# Patient Record
Sex: MALE | Race: WHITE | NOT HISPANIC OR LATINO | Employment: FULL TIME | URBAN - NONMETROPOLITAN AREA
[De-identification: names, ages, dates, MRNs, and addresses within clinical notes are randomized per-mention and may not be internally consistent; named-entity substitution may affect disease eponyms.]

---

## 2018-11-14 ENCOUNTER — OFFICE VISIT CONVERTED (OUTPATIENT)
Dept: OTOLARYNGOLOGY | Facility: CLINIC | Age: 63
End: 2018-11-14
Attending: OTOLARYNGOLOGY

## 2019-01-07 ENCOUNTER — HOSPITAL ENCOUNTER (OUTPATIENT)
Dept: LAB | Facility: HOSPITAL | Age: 64
Discharge: HOME | End: 2019-01-07
Attending: INTERNAL MEDICINE

## 2019-01-07 LAB
ALBUMIN SERPL-MCNC: 4.4 G/DL (ref 3.5–5)
ALBUMIN/GLOB SERPL: 1.9 {RATIO} (ref 1.4–2.6)
ALP SERPL-CCNC: 169 U/L (ref 56–155)
ALT SERPL-CCNC: 56 U/L (ref 10–40)
ANION GAP SERPL CALC-SCNC: 14 MMOL/L (ref 8–19)
AST SERPL-CCNC: 40 U/L (ref 15–50)
BASOPHILS # BLD AUTO: 0.02 10*3/UL (ref 0–0.2)
BASOPHILS NFR BLD AUTO: 0.38 % (ref 0–3)
BILIRUB SERPL-MCNC: 0.44 MG/DL (ref 0.2–1.3)
BUN SERPL-MCNC: 12 MG/DL (ref 5–25)
BUN/CREAT SERPL: 12 {RATIO} (ref 6–20)
CALCIUM SERPL-MCNC: 9.4 MG/DL (ref 8.7–10.4)
CHLORIDE SERPL-SCNC: 106 MMOL/L (ref 99–111)
CHOLEST SERPL-MCNC: 147 MG/DL (ref 107–200)
CHOLEST/HDLC SERPL: 2.7 {RATIO} (ref 3–6)
CK SERPL-CCNC: 148 U/L (ref 57–374)
CONV CO2: 26 MMOL/L (ref 22–32)
CONV TOTAL PROTEIN: 6.7 G/DL (ref 6.3–8.2)
CREAT UR-MCNC: 0.97 MG/DL (ref 0.7–1.2)
EOSINOPHIL # BLD AUTO: 0.01 10*3/UL (ref 0–0.7)
EOSINOPHIL # BLD AUTO: 0.13 % (ref 0–7)
ERYTHROCYTE [DISTWIDTH] IN BLOOD BY AUTOMATED COUNT: 11.3 % (ref 11.5–14.5)
GFR SERPLBLD BASED ON 1.73 SQ M-ARVRAT: >60 ML/MIN/{1.73_M2}
GLOBULIN UR ELPH-MCNC: 2.3 G/DL (ref 2–3.5)
GLUCOSE SERPL-MCNC: 93 MG/DL (ref 70–99)
HBA1C MFR BLD: 13.4 G/DL (ref 14–18)
HCT VFR BLD AUTO: 39.9 % (ref 42–52)
HDLC SERPL-MCNC: 54 MG/DL (ref 40–60)
LDLC SERPL CALC-MCNC: 81 MG/DL (ref 70–100)
LYMPHOCYTES # BLD AUTO: 1.49 10*3/UL (ref 1–5)
MCH RBC QN AUTO: 32 PG (ref 27–31)
MCHC RBC AUTO-ENTMCNC: 33.7 G/DL (ref 33–37)
MCV RBC AUTO: 95.1 FL (ref 80–96)
MONOCYTES # BLD AUTO: 0.47 10*3/UL (ref 0.2–1.2)
MONOCYTES NFR BLD AUTO: 12 % (ref 3–10)
NEUTROPHILS # BLD AUTO: 1.96 10*3/UL (ref 2–8)
NEUTROPHILS NFR BLD AUTO: 49.8 % (ref 30–85)
NRBC BLD AUTO-RTO: 0 % (ref 0–0.01)
OSMOLALITY SERPL CALC.SUM OF ELEC: 293 MOSM/KG (ref 273–304)
PLATELET # BLD AUTO: 270 10*3/UL (ref 130–400)
PMV BLD AUTO: 7.3 FL (ref 7.4–10.4)
POTASSIUM SERPL-SCNC: 4.1 MMOL/L (ref 3.5–5.3)
RBC # BLD AUTO: 4.2 10*6/UL (ref 4.7–6.1)
SODIUM SERPL-SCNC: 142 MMOL/L (ref 135–147)
TRIGL SERPL-MCNC: 61 MG/DL (ref 40–150)
TSH SERPL-ACNC: 0.31 M[IU]/L (ref 0.27–4.2)
VARIANT LYMPHS NFR BLD MANUAL: 37.7 % (ref 20–45)
VLDLC SERPL-MCNC: 12 MG/DL (ref 5–37)
WBC # BLD AUTO: 3.94 10*3/UL (ref 4.8–10.8)

## 2019-07-03 ENCOUNTER — HOSPITAL ENCOUNTER (OUTPATIENT)
Dept: LAB | Facility: HOSPITAL | Age: 64
Discharge: HOME | End: 2019-07-03
Attending: INTERNAL MEDICINE

## 2019-07-03 LAB
ALBUMIN SERPL-MCNC: 4.5 G/DL (ref 3.5–5)
ALBUMIN/GLOB SERPL: 1.8 {RATIO} (ref 1.4–2.6)
ALP SERPL-CCNC: 120 U/L (ref 56–155)
ALT SERPL-CCNC: 27 U/L (ref 10–40)
AMYLASE SERPL-CCNC: 41 U/L (ref 30–110)
ANION GAP SERPL CALC-SCNC: 17 MMOL/L (ref 8–19)
AST SERPL-CCNC: 34 U/L (ref 15–50)
BASOPHILS # BLD AUTO: 0.04 10*3/UL (ref 0–0.2)
BASOPHILS NFR BLD AUTO: 1.1 % (ref 0–3)
BILIRUB SERPL-MCNC: 0.66 MG/DL (ref 0.2–1.3)
BUN SERPL-MCNC: 14 MG/DL (ref 5–25)
BUN/CREAT SERPL: 15 {RATIO} (ref 6–20)
CALCIUM SERPL-MCNC: 9.4 MG/DL (ref 8.7–10.4)
CHLORIDE SERPL-SCNC: 101 MMOL/L (ref 99–111)
CHOLEST SERPL-MCNC: 133 MG/DL (ref 107–200)
CHOLEST/HDLC SERPL: 2.4 {RATIO} (ref 3–6)
CONV ABS IMM GRAN: 0 10*3/UL (ref 0–0.2)
CONV CO2: 27 MMOL/L (ref 22–32)
CONV IMMATURE GRAN: 0 % (ref 0–1.8)
CONV TOTAL PROTEIN: 7 G/DL (ref 6.3–8.2)
CREAT UR-MCNC: 0.96 MG/DL (ref 0.7–1.2)
DEPRECATED RDW RBC AUTO: 43.2 FL (ref 35.1–43.9)
EOSINOPHIL # BLD AUTO: 0 % (ref 0–7)
EOSINOPHIL # BLD AUTO: 0 10*3/UL (ref 0–0.7)
ERYTHROCYTE [DISTWIDTH] IN BLOOD BY AUTOMATED COUNT: 12.2 % (ref 11.6–14.4)
GFR SERPLBLD BASED ON 1.73 SQ M-ARVRAT: >60 ML/MIN/{1.73_M2}
GLOBULIN UR ELPH-MCNC: 2.5 G/DL (ref 2–3.5)
GLUCOSE SERPL-MCNC: 92 MG/DL (ref 70–99)
HBA1C MFR BLD: 13.9 G/DL (ref 14–18)
HCT VFR BLD AUTO: 42.7 % (ref 42–52)
HDLC SERPL-MCNC: 56 MG/DL (ref 40–60)
LDLC SERPL CALC-MCNC: 67 MG/DL (ref 70–100)
LIPASE SERPL-CCNC: 30 U/L (ref 5–51)
LYMPHOCYTES # BLD AUTO: 1.19 10*3/UL (ref 1–5)
MCH RBC QN AUTO: 31.4 PG (ref 27–31)
MCHC RBC AUTO-ENTMCNC: 32.6 G/DL (ref 33–37)
MCV RBC AUTO: 96.4 FL (ref 80–96)
MONOCYTES # BLD AUTO: 0.43 10*3/UL (ref 0.2–1.2)
MONOCYTES NFR BLD AUTO: 11.6 % (ref 3–10)
NEUTROPHILS # BLD AUTO: 2.06 10*3/UL (ref 2–8)
NEUTROPHILS NFR BLD AUTO: 55.3 % (ref 30–85)
NRBC CBCN: 0 % (ref 0–0.7)
OSMOLALITY SERPL CALC.SUM OF ELEC: 292 MOSM/KG (ref 273–304)
PLATELET # BLD AUTO: 216 10*3/UL (ref 130–400)
PMV BLD AUTO: 10.3 FL (ref 9.4–12.4)
POTASSIUM SERPL-SCNC: 3.9 MMOL/L (ref 3.5–5.3)
RBC # BLD AUTO: 4.43 10*6/UL (ref 4.7–6.1)
SODIUM SERPL-SCNC: 141 MMOL/L (ref 135–147)
TRIGL SERPL-MCNC: 51 MG/DL (ref 40–150)
TSH SERPL-ACNC: 0.17 M[IU]/L (ref 0.27–4.2)
VARIANT LYMPHS NFR BLD MANUAL: 32 % (ref 20–45)
VLDLC SERPL-MCNC: 10 MG/DL (ref 5–37)
WBC # BLD AUTO: 3.72 10*3/UL (ref 4.8–10.8)

## 2019-08-06 ENCOUNTER — CONVERSION ENCOUNTER (OUTPATIENT)
Dept: GASTROENTEROLOGY | Facility: CLINIC | Age: 64
End: 2019-08-06
Attending: INTERNAL MEDICINE

## 2019-09-16 ENCOUNTER — HOSPITAL ENCOUNTER (OUTPATIENT)
Dept: GASTROENTEROLOGY | Facility: HOSPITAL | Age: 64
Setting detail: HOSPITAL OUTPATIENT SURGERY
Discharge: HOME | End: 2019-09-16
Attending: INTERNAL MEDICINE

## 2019-09-16 LAB
ALBUMIN SERPL-MCNC: 4.8 G/DL (ref 3.5–5)
ALBUMIN/GLOB SERPL: 1.8 {RATIO} (ref 1.4–2.6)
ALP SERPL-CCNC: 165 U/L (ref 56–155)
ALT SERPL-CCNC: 38 U/L (ref 10–40)
ANION GAP SERPL CALC-SCNC: 18 MMOL/L (ref 8–19)
AST SERPL-CCNC: 34 U/L (ref 15–50)
BASOPHILS # BLD AUTO: 0.02 10*3/UL (ref 0–0.2)
BASOPHILS NFR BLD AUTO: 0.6 % (ref 0–3)
BILIRUB SERPL-MCNC: 0.57 MG/DL (ref 0.2–1.3)
BUN SERPL-MCNC: 12 MG/DL (ref 5–25)
BUN/CREAT SERPL: 13 {RATIO} (ref 6–20)
CALCIUM SERPL-MCNC: 9.5 MG/DL (ref 8.7–10.4)
CANCER AG19-9 SERPL-ACNC: 16.7 U/ML (ref 0–35)
CHLORIDE SERPL-SCNC: 105 MMOL/L (ref 99–111)
CONV ABS IMM GRAN: 0.01 10*3/UL (ref 0–0.2)
CONV CO2: 23 MMOL/L (ref 22–32)
CONV IMMATURE GRAN: 0.3 % (ref 0–1.8)
CONV TOTAL PROTEIN: 7.4 G/DL (ref 6.3–8.2)
CREAT UR-MCNC: 0.94 MG/DL (ref 0.7–1.2)
DEPRECATED RDW RBC AUTO: 42.2 FL (ref 35.1–43.9)
EOSINOPHIL # BLD AUTO: 0.01 10*3/UL (ref 0–0.7)
EOSINOPHIL # BLD AUTO: 0.3 % (ref 0–7)
ERYTHROCYTE [DISTWIDTH] IN BLOOD BY AUTOMATED COUNT: 12 % (ref 11.6–14.4)
GFR SERPLBLD BASED ON 1.73 SQ M-ARVRAT: >60 ML/MIN/{1.73_M2}
GLOBULIN UR ELPH-MCNC: 2.6 G/DL (ref 2–3.5)
GLUCOSE SERPL-MCNC: 88 MG/DL (ref 70–99)
HCT VFR BLD AUTO: 44.5 % (ref 42–52)
HGB BLD-MCNC: 14.7 G/DL (ref 14–18)
LYMPHOCYTES # BLD AUTO: 0.73 10*3/UL (ref 1–5)
LYMPHOCYTES NFR BLD AUTO: 20.2 % (ref 20–45)
MCH RBC QN AUTO: 31.6 PG (ref 27–31)
MCHC RBC AUTO-ENTMCNC: 33 G/DL (ref 33–37)
MCV RBC AUTO: 95.7 FL (ref 80–96)
MONOCYTES # BLD AUTO: 0.3 10*3/UL (ref 0.2–1.2)
MONOCYTES NFR BLD AUTO: 8.3 % (ref 3–10)
NEUTROPHILS # BLD AUTO: 2.55 10*3/UL (ref 2–8)
NEUTROPHILS NFR BLD AUTO: 70.3 % (ref 30–85)
NRBC CBCN: 0 % (ref 0–0.7)
OSMOLALITY SERPL CALC.SUM OF ELEC: 293 MOSM/KG (ref 273–304)
PLATELET # BLD AUTO: 228 10*3/UL (ref 130–400)
PMV BLD AUTO: 9.8 FL (ref 9.4–12.4)
POTASSIUM SERPL-SCNC: 4.3 MMOL/L (ref 3.5–5.3)
RBC # BLD AUTO: 4.65 10*6/UL (ref 4.7–6.1)
SODIUM SERPL-SCNC: 142 MMOL/L (ref 135–147)
WBC # BLD AUTO: 3.62 10*3/UL (ref 4.8–10.8)

## 2019-11-05 ENCOUNTER — HOSPITAL ENCOUNTER (OUTPATIENT)
Dept: LAB | Facility: HOSPITAL | Age: 64
Discharge: HOME | End: 2019-11-05
Attending: INTERNAL MEDICINE

## 2019-11-05 LAB — TSH SERPL-ACNC: 0.86 M[IU]/L (ref 0.27–4.2)

## 2019-12-05 ENCOUNTER — HOSPITAL ENCOUNTER (OUTPATIENT)
Dept: OTHER | Facility: HOSPITAL | Age: 64
Discharge: HOME | End: 2019-12-05
Attending: PHYSICIAN ASSISTANT

## 2019-12-05 LAB
ALBUMIN SERPL-MCNC: 4.5 G/DL (ref 3.5–5)
ALBUMIN/GLOB SERPL: 1.5 {RATIO} (ref 1.4–2.6)
ALP SERPL-CCNC: 627 U/L (ref 56–155)
ALT SERPL-CCNC: 413 U/L (ref 10–40)
AMYLASE SERPL-CCNC: 41 U/L (ref 30–110)
ANION GAP SERPL CALC-SCNC: 17 MMOL/L (ref 8–19)
APPEARANCE UR: CLEAR
AST SERPL-CCNC: 406 U/L (ref 15–50)
BASOPHILS # BLD AUTO: 0.04 10*3/UL (ref 0–0.2)
BASOPHILS NFR BLD AUTO: 0.9 % (ref 0–3)
BILIRUB SERPL-MCNC: 6.34 MG/DL (ref 0.2–1.3)
BILIRUB UR QL: ABNORMAL
BUN SERPL-MCNC: 10 MG/DL (ref 5–25)
BUN/CREAT SERPL: 12 {RATIO} (ref 6–20)
CALCIUM SERPL-MCNC: 9.7 MG/DL (ref 8.7–10.4)
CHLORIDE SERPL-SCNC: 99 MMOL/L (ref 99–111)
COLOR UR: ABNORMAL
CONV ABS IMM GRAN: 0.01 10*3/UL (ref 0–0.2)
CONV BACTERIA: NEGATIVE
CONV CO2: 26 MMOL/L (ref 22–32)
CONV COLLECTION SOURCE (UA): ABNORMAL
CONV IMMATURE GRAN: 0.2 % (ref 0–1.8)
CONV TOTAL PROTEIN: 7.6 G/DL (ref 6.3–8.2)
CONV UROBILINOGEN IN URINE BY AUTOMATED TEST STRIP: 0.2 {EHRLICHU}/DL (ref 0.1–1)
CREAT UR-MCNC: 0.81 MG/DL (ref 0.7–1.2)
DEPRECATED RDW RBC AUTO: 46.7 FL (ref 35.1–43.9)
EOSINOPHIL # BLD AUTO: 0 % (ref 0–7)
EOSINOPHIL # BLD AUTO: 0 10*3/UL (ref 0–0.7)
ERYTHROCYTE [DISTWIDTH] IN BLOOD BY AUTOMATED COUNT: 13.2 % (ref 11.6–14.4)
GFR SERPLBLD BASED ON 1.73 SQ M-ARVRAT: >60 ML/MIN/{1.73_M2}
GLOBULIN UR ELPH-MCNC: 3.1 G/DL (ref 2–3.5)
GLUCOSE SERPL-MCNC: 102 MG/DL (ref 70–99)
GLUCOSE UR QL: NEGATIVE MG/DL
HCT VFR BLD AUTO: 43 % (ref 42–52)
HGB BLD-MCNC: 14 G/DL (ref 14–18)
HGB UR QL STRIP: NEGATIVE
KETONES UR QL STRIP: NEGATIVE MG/DL
LEUKOCYTE ESTERASE UR QL STRIP: ABNORMAL
LIPASE SERPL-CCNC: 55 U/L (ref 5–51)
LYMPHOCYTES # BLD AUTO: 1.11 10*3/UL (ref 1–5)
LYMPHOCYTES NFR BLD AUTO: 26.1 % (ref 20–45)
MCH RBC QN AUTO: 31 PG (ref 27–31)
MCHC RBC AUTO-ENTMCNC: 32.6 G/DL (ref 33–37)
MCV RBC AUTO: 95.3 FL (ref 80–96)
MONOCYTES # BLD AUTO: 0.6 10*3/UL (ref 0.2–1.2)
MONOCYTES NFR BLD AUTO: 14.1 % (ref 3–10)
NEUTROPHILS # BLD AUTO: 2.49 10*3/UL (ref 2–8)
NEUTROPHILS NFR BLD AUTO: 58.7 % (ref 30–85)
NITRITE UR QL STRIP: NEGATIVE
NRBC CBCN: 0 % (ref 0–0.7)
OSMOLALITY SERPL CALC.SUM OF ELEC: 287 MOSM/KG (ref 273–304)
PH UR STRIP.AUTO: 7 [PH] (ref 5–8)
PLATELET # BLD AUTO: 258 10*3/UL (ref 130–400)
PMV BLD AUTO: 10.4 FL (ref 9.4–12.4)
POTASSIUM SERPL-SCNC: 3.4 MMOL/L (ref 3.5–5.3)
PROT UR QL: ABNORMAL MG/DL
RBC # BLD AUTO: 4.51 10*6/UL (ref 4.7–6.1)
RBC #/AREA URNS HPF: ABNORMAL /[HPF]
SODIUM SERPL-SCNC: 139 MMOL/L (ref 135–147)
SP GR UR: 1.02 (ref 1–1.03)
WBC # BLD AUTO: 4.25 10*3/UL (ref 4.8–10.8)
WBC #/AREA URNS HPF: ABNORMAL /[HPF]

## 2020-01-10 ENCOUNTER — HOSPITAL ENCOUNTER (OUTPATIENT)
Dept: GENERAL RADIOLOGY | Facility: HOSPITAL | Age: 65
Discharge: HOME | End: 2020-01-10
Attending: INTERNAL MEDICINE

## 2020-01-10 LAB
ALBUMIN SERPL-MCNC: 4.2 G/DL (ref 3.5–5)
ALBUMIN/GLOB SERPL: 1.1 {RATIO} (ref 1.4–2.6)
ALP SERPL-CCNC: 290 U/L (ref 56–155)
ALT SERPL-CCNC: 43 U/L (ref 10–40)
ANION GAP SERPL CALC-SCNC: 21 MMOL/L (ref 8–19)
AST SERPL-CCNC: 49 U/L (ref 15–50)
BASOPHILS # BLD AUTO: 0.07 10*3/UL (ref 0–0.2)
BASOPHILS NFR BLD AUTO: 1.2 % (ref 0–3)
BILIRUB SERPL-MCNC: 1 MG/DL (ref 0.2–1.3)
BUN SERPL-MCNC: 12 MG/DL (ref 5–25)
BUN/CREAT SERPL: 12 {RATIO} (ref 6–20)
CALCIUM SERPL-MCNC: 9.8 MG/DL (ref 8.7–10.4)
CHLORIDE SERPL-SCNC: 101 MMOL/L (ref 99–111)
CHOLEST SERPL-MCNC: 176 MG/DL (ref 107–200)
CHOLEST/HDLC SERPL: 4.3 {RATIO} (ref 3–6)
CONV ABS IMM GRAN: 0.06 10*3/UL (ref 0–0.2)
CONV CO2: 23 MMOL/L (ref 22–32)
CONV IMMATURE GRAN: 1 % (ref 0–1.8)
CONV TOTAL PROTEIN: 8 G/DL (ref 6.3–8.2)
CREAT UR-MCNC: 0.99 MG/DL (ref 0.7–1.2)
DEPRECATED RDW RBC AUTO: 45.6 FL (ref 35.1–43.9)
EOSINOPHIL # BLD AUTO: 0.02 10*3/UL (ref 0–0.7)
EOSINOPHIL # BLD AUTO: 0.3 % (ref 0–7)
ERYTHROCYTE [DISTWIDTH] IN BLOOD BY AUTOMATED COUNT: 13 % (ref 11.6–14.4)
GFR SERPLBLD BASED ON 1.73 SQ M-ARVRAT: >60 ML/MIN/{1.73_M2}
GLOBULIN UR ELPH-MCNC: 3.8 G/DL (ref 2–3.5)
GLUCOSE SERPL-MCNC: 100 MG/DL (ref 70–99)
HCT VFR BLD AUTO: 40.7 % (ref 42–52)
HDLC SERPL-MCNC: 41 MG/DL (ref 40–60)
HGB BLD-MCNC: 12.8 G/DL (ref 14–18)
LDLC SERPL CALC-MCNC: 118 MG/DL (ref 70–100)
LYMPHOCYTES # BLD AUTO: 1.09 10*3/UL (ref 1–5)
LYMPHOCYTES NFR BLD AUTO: 18.9 % (ref 20–45)
MCH RBC QN AUTO: 30.5 PG (ref 27–31)
MCHC RBC AUTO-ENTMCNC: 31.4 G/DL (ref 33–37)
MCV RBC AUTO: 96.9 FL (ref 80–96)
MONOCYTES # BLD AUTO: 0.56 10*3/UL (ref 0.2–1.2)
MONOCYTES NFR BLD AUTO: 9.7 % (ref 3–10)
NEUTROPHILS # BLD AUTO: 3.98 10*3/UL (ref 2–8)
NEUTROPHILS NFR BLD AUTO: 68.9 % (ref 30–85)
NRBC CBCN: 0 % (ref 0–0.7)
OSMOLALITY SERPL CALC.SUM OF ELEC: 292 MOSM/KG (ref 273–304)
PLATELET # BLD AUTO: 567 10*3/UL (ref 130–400)
PMV BLD AUTO: 9.1 FL (ref 9.4–12.4)
POTASSIUM SERPL-SCNC: 4.2 MMOL/L (ref 3.5–5.3)
PSA SERPL-MCNC: 5.26 NG/ML (ref 0–4)
RBC # BLD AUTO: 4.2 10*6/UL (ref 4.7–6.1)
SODIUM SERPL-SCNC: 141 MMOL/L (ref 135–147)
TRIGL SERPL-MCNC: 85 MG/DL (ref 40–150)
TSH SERPL-ACNC: 23.5 M[IU]/L (ref 0.27–4.2)
VLDLC SERPL-MCNC: 17 MG/DL (ref 5–37)
WBC # BLD AUTO: 5.78 10*3/UL (ref 4.8–10.8)

## 2020-01-16 ENCOUNTER — OFFICE VISIT CONVERTED (OUTPATIENT)
Dept: GASTROENTEROLOGY | Facility: CLINIC | Age: 65
End: 2020-01-16
Attending: INTERNAL MEDICINE

## 2020-01-16 ENCOUNTER — CONVERSION ENCOUNTER (OUTPATIENT)
Dept: GASTROENTEROLOGY | Facility: CLINIC | Age: 65
End: 2020-01-16

## 2020-02-21 ENCOUNTER — HOSPITAL ENCOUNTER (OUTPATIENT)
Dept: GENERAL RADIOLOGY | Facility: HOSPITAL | Age: 65
Discharge: HOME | End: 2020-02-21
Attending: INTERNAL MEDICINE

## 2020-02-21 LAB — TSH SERPL-ACNC: 0.43 M[IU]/L (ref 0.27–4.2)

## 2020-04-13 ENCOUNTER — HOSPITAL ENCOUNTER (OUTPATIENT)
Dept: GENERAL RADIOLOGY | Facility: HOSPITAL | Age: 65
Discharge: HOME | End: 2020-04-13
Attending: INTERNAL MEDICINE

## 2020-04-13 LAB
ALBUMIN SERPL-MCNC: 4.5 G/DL (ref 3.5–5)
ALBUMIN/GLOB SERPL: 1.6 {RATIO} (ref 1.4–2.6)
ALP SERPL-CCNC: 184 U/L (ref 56–155)
ALT SERPL-CCNC: 31 U/L (ref 10–40)
ANION GAP SERPL CALC-SCNC: 14 MMOL/L (ref 8–19)
AST SERPL-CCNC: 39 U/L (ref 15–50)
BASOPHILS # BLD AUTO: 0.04 10*3/UL (ref 0–0.2)
BASOPHILS NFR BLD AUTO: 1 % (ref 0–3)
BILIRUB SERPL-MCNC: 0.51 MG/DL (ref 0.2–1.3)
BUN SERPL-MCNC: 13 MG/DL (ref 5–25)
BUN/CREAT SERPL: 13 {RATIO} (ref 6–20)
CALCIUM SERPL-MCNC: 9.5 MG/DL (ref 8.7–10.4)
CHLORIDE SERPL-SCNC: 99 MMOL/L (ref 99–111)
CHOLEST SERPL-MCNC: 149 MG/DL (ref 107–200)
CHOLEST/HDLC SERPL: 2.7 {RATIO} (ref 3–6)
CONV ABS IMM GRAN: 0.01 10*3/UL (ref 0–0.2)
CONV CO2: 28 MMOL/L (ref 22–32)
CONV IMMATURE GRAN: 0.2 % (ref 0–1.8)
CONV TOTAL PROTEIN: 7.4 G/DL (ref 6.3–8.2)
CREAT UR-MCNC: 0.97 MG/DL (ref 0.7–1.2)
DEPRECATED RDW RBC AUTO: 44.9 FL (ref 35.1–43.9)
EOSINOPHIL # BLD AUTO: 0.01 10*3/UL (ref 0–0.7)
EOSINOPHIL # BLD AUTO: 0.2 % (ref 0–7)
ERYTHROCYTE [DISTWIDTH] IN BLOOD BY AUTOMATED COUNT: 13.2 % (ref 11.6–14.4)
GFR SERPLBLD BASED ON 1.73 SQ M-ARVRAT: >60 ML/MIN/{1.73_M2}
GLOBULIN UR ELPH-MCNC: 2.9 G/DL (ref 2–3.5)
GLUCOSE SERPL-MCNC: 100 MG/DL (ref 70–99)
HCT VFR BLD AUTO: 46.7 % (ref 42–52)
HDLC SERPL-MCNC: 56 MG/DL (ref 40–60)
HGB BLD-MCNC: 14.8 G/DL (ref 14–18)
LDLC SERPL CALC-MCNC: 74 MG/DL (ref 70–100)
LYMPHOCYTES # BLD AUTO: 1.2 10*3/UL (ref 1–5)
LYMPHOCYTES NFR BLD AUTO: 29.5 % (ref 20–45)
MCH RBC QN AUTO: 29.6 PG (ref 27–31)
MCHC RBC AUTO-ENTMCNC: 31.7 G/DL (ref 33–37)
MCV RBC AUTO: 93.4 FL (ref 80–96)
MONOCYTES # BLD AUTO: 0.45 10*3/UL (ref 0.2–1.2)
MONOCYTES NFR BLD AUTO: 11.1 % (ref 3–10)
NEUTROPHILS # BLD AUTO: 2.36 10*3/UL (ref 2–8)
NEUTROPHILS NFR BLD AUTO: 58 % (ref 30–85)
NRBC CBCN: 0 % (ref 0–0.7)
OSMOLALITY SERPL CALC.SUM OF ELEC: 284 MOSM/KG (ref 273–304)
PLATELET # BLD AUTO: 277 10*3/UL (ref 130–400)
PMV BLD AUTO: 10.2 FL (ref 9.4–12.4)
POTASSIUM SERPL-SCNC: 4.1 MMOL/L (ref 3.5–5.3)
RBC # BLD AUTO: 5 10*6/UL (ref 4.7–6.1)
SODIUM SERPL-SCNC: 137 MMOL/L (ref 135–147)
TRIGL SERPL-MCNC: 95 MG/DL (ref 40–150)
TSH SERPL-ACNC: 0.61 M[IU]/L (ref 0.27–4.2)
VLDLC SERPL-MCNC: 19 MG/DL (ref 5–37)
WBC # BLD AUTO: 4.07 10*3/UL (ref 4.8–10.8)

## 2020-04-17 ENCOUNTER — HOSPITAL ENCOUNTER (OUTPATIENT)
Dept: LAB | Facility: HOSPITAL | Age: 65
Discharge: HOME | End: 2020-04-17
Attending: INTERNAL MEDICINE

## 2020-07-13 ENCOUNTER — HOSPITAL ENCOUNTER (OUTPATIENT)
Dept: LAB | Facility: HOSPITAL | Age: 65
Discharge: HOME | End: 2020-07-13
Attending: INTERNAL MEDICINE

## 2020-07-13 LAB
ALBUMIN SERPL-MCNC: 4.4 G/DL (ref 3.5–5)
ALBUMIN/GLOB SERPL: 1.8 {RATIO} (ref 1.4–2.6)
ALP SERPL-CCNC: 140 U/L (ref 56–155)
ALT SERPL-CCNC: 31 U/L (ref 10–40)
ANION GAP SERPL CALC-SCNC: 17 MMOL/L (ref 8–19)
AST SERPL-CCNC: 36 U/L (ref 15–50)
BILIRUB SERPL-MCNC: 0.47 MG/DL (ref 0.2–1.3)
BUN SERPL-MCNC: 12 MG/DL (ref 5–25)
BUN/CREAT SERPL: 12 {RATIO} (ref 6–20)
CALCIUM SERPL-MCNC: 9.1 MG/DL (ref 8.7–10.4)
CHLORIDE SERPL-SCNC: 107 MMOL/L (ref 99–111)
CHOLEST SERPL-MCNC: 127 MG/DL (ref 107–200)
CHOLEST/HDLC SERPL: 2.3 {RATIO} (ref 3–6)
CONV CO2: 23 MMOL/L (ref 22–32)
CONV TOTAL PROTEIN: 6.8 G/DL (ref 6.3–8.2)
CREAT UR-MCNC: 1 MG/DL (ref 0.7–1.2)
GFR SERPLBLD BASED ON 1.73 SQ M-ARVRAT: >60 ML/MIN/{1.73_M2}
GLOBULIN UR ELPH-MCNC: 2.4 G/DL (ref 2–3.5)
GLUCOSE SERPL-MCNC: 94 MG/DL (ref 70–99)
HDLC SERPL-MCNC: 55 MG/DL (ref 40–60)
LDLC SERPL CALC-MCNC: 59 MG/DL (ref 70–100)
OSMOLALITY SERPL CALC.SUM OF ELEC: 296 MOSM/KG (ref 273–304)
POTASSIUM SERPL-SCNC: 3.9 MMOL/L (ref 3.5–5.3)
PSA SERPL-MCNC: 3.95 NG/ML (ref 0–4)
SODIUM SERPL-SCNC: 143 MMOL/L (ref 135–147)
TRIGL SERPL-MCNC: 63 MG/DL (ref 40–150)
TSH SERPL-ACNC: 0.84 M[IU]/L (ref 0.27–4.2)
VLDLC SERPL-MCNC: 13 MG/DL (ref 5–37)

## 2020-10-12 ENCOUNTER — HOSPITAL ENCOUNTER (OUTPATIENT)
Dept: LAB | Facility: HOSPITAL | Age: 65
Discharge: HOME | End: 2020-10-12
Attending: INTERNAL MEDICINE

## 2020-10-12 LAB
ALBUMIN SERPL-MCNC: 4.3 G/DL (ref 3.5–5)
ALBUMIN/GLOB SERPL: 1.8 {RATIO} (ref 1.4–2.6)
ALP SERPL-CCNC: 121 U/L (ref 56–155)
ALT SERPL-CCNC: 26 U/L (ref 10–40)
AMYLASE SERPL-CCNC: 44 U/L (ref 30–110)
ANION GAP SERPL CALC-SCNC: 15 MMOL/L (ref 8–19)
AST SERPL-CCNC: 37 U/L (ref 15–50)
BASOPHILS # BLD AUTO: 0.03 10*3/UL (ref 0–0.2)
BASOPHILS NFR BLD AUTO: 1 % (ref 0–3)
BILIRUB SERPL-MCNC: 0.46 MG/DL (ref 0.2–1.3)
BUN SERPL-MCNC: 10 MG/DL (ref 5–25)
BUN/CREAT SERPL: 9 {RATIO} (ref 6–20)
CALCIUM SERPL-MCNC: 9.8 MG/DL (ref 8.7–10.4)
CHLORIDE SERPL-SCNC: 103 MMOL/L (ref 99–111)
CONV ABS IMM GRAN: 0.01 10*3/UL (ref 0–0.2)
CONV CO2: 26 MMOL/L (ref 22–32)
CONV IMMATURE GRAN: 0.3 % (ref 0–1.8)
CONV TOTAL PROTEIN: 6.7 G/DL (ref 6.3–8.2)
CREAT UR-MCNC: 1.07 MG/DL (ref 0.7–1.2)
DEPRECATED RDW RBC AUTO: 44.7 FL (ref 35.1–43.9)
EOSINOPHIL # BLD AUTO: 0 % (ref 0–7)
EOSINOPHIL # BLD AUTO: 0 10*3/UL (ref 0–0.7)
ERYTHROCYTE [DISTWIDTH] IN BLOOD BY AUTOMATED COUNT: 12.5 % (ref 11.6–14.4)
FOLATE SERPL-MCNC: >20 NG/ML (ref 4.8–20)
GFR SERPLBLD BASED ON 1.73 SQ M-ARVRAT: >60 ML/MIN/{1.73_M2}
GLOBULIN UR ELPH-MCNC: 2.4 G/DL (ref 2–3.5)
GLUCOSE SERPL-MCNC: 93 MG/DL (ref 70–99)
HCT VFR BLD AUTO: 42.9 % (ref 42–52)
HGB BLD-MCNC: 13.9 G/DL (ref 14–18)
LIPASE SERPL-CCNC: 23 U/L (ref 5–51)
LYMPHOCYTES # BLD AUTO: 0.98 10*3/UL (ref 1–5)
LYMPHOCYTES NFR BLD AUTO: 31.5 % (ref 20–45)
MCH RBC QN AUTO: 31.4 PG (ref 27–31)
MCHC RBC AUTO-ENTMCNC: 32.4 G/DL (ref 33–37)
MCV RBC AUTO: 97.1 FL (ref 80–96)
MONOCYTES # BLD AUTO: 0.36 10*3/UL (ref 0.2–1.2)
MONOCYTES NFR BLD AUTO: 11.6 % (ref 3–10)
NEUTROPHILS # BLD AUTO: 1.73 10*3/UL (ref 2–8)
NEUTROPHILS NFR BLD AUTO: 55.6 % (ref 30–85)
NRBC CBCN: 0 % (ref 0–0.7)
OSMOLALITY SERPL CALC.SUM OF ELEC: 289 MOSM/KG (ref 273–304)
PLATELET # BLD AUTO: 237 10*3/UL (ref 130–400)
PMV BLD AUTO: 10.1 FL (ref 9.4–12.4)
POTASSIUM SERPL-SCNC: 4.1 MMOL/L (ref 3.5–5.3)
RBC # BLD AUTO: 4.42 10*6/UL (ref 4.7–6.1)
SODIUM SERPL-SCNC: 140 MMOL/L (ref 135–147)
VIT B12 SERPL-MCNC: 421 PG/ML (ref 211–911)
WBC # BLD AUTO: 3.11 10*3/UL (ref 4.8–10.8)

## 2021-04-08 ENCOUNTER — HOSPITAL ENCOUNTER (OUTPATIENT)
Dept: LAB | Facility: HOSPITAL | Age: 66
Discharge: HOME | End: 2021-04-08
Attending: INTERNAL MEDICINE

## 2021-04-08 LAB
ALBUMIN SERPL-MCNC: 4.4 G/DL (ref 3.5–5)
ALBUMIN/GLOB SERPL: 1.8 {RATIO} (ref 1.4–2.6)
ALP SERPL-CCNC: 123 U/L (ref 56–155)
ALT SERPL-CCNC: 27 U/L (ref 10–40)
AMYLASE SERPL-CCNC: 48 U/L (ref 30–110)
ANION GAP SERPL CALC-SCNC: 13 MMOL/L (ref 8–19)
AST SERPL-CCNC: 36 U/L (ref 15–50)
BASOPHILS # BLD AUTO: 0.03 10*3/UL (ref 0–0.2)
BASOPHILS NFR BLD AUTO: 0.9 % (ref 0–3)
BILIRUB SERPL-MCNC: 0.55 MG/DL (ref 0.2–1.3)
BUN SERPL-MCNC: 15 MG/DL (ref 5–25)
BUN/CREAT SERPL: 15 {RATIO} (ref 6–20)
CALCIUM SERPL-MCNC: 9.2 MG/DL (ref 8.7–10.4)
CHLORIDE SERPL-SCNC: 105 MMOL/L (ref 99–111)
CHOLEST SERPL-MCNC: 155 MG/DL (ref 107–200)
CHOLEST/HDLC SERPL: 2.5 {RATIO} (ref 3–6)
CONV ABS IMM GRAN: 0.01 10*3/UL (ref 0–0.2)
CONV AFP: 7.4 NG/ML (ref 0–8.7)
CONV CO2: 27 MMOL/L (ref 22–32)
CONV IMMATURE GRAN: 0.3 % (ref 0–1.8)
CONV TOTAL PROTEIN: 6.9 G/DL (ref 6.3–8.2)
CREAT UR-MCNC: 1 MG/DL (ref 0.7–1.2)
DEPRECATED RDW RBC AUTO: 43.2 FL (ref 35.1–43.9)
EOSINOPHIL # BLD AUTO: 0 % (ref 0–7)
EOSINOPHIL # BLD AUTO: 0 10*3/UL (ref 0–0.7)
ERYTHROCYTE [DISTWIDTH] IN BLOOD BY AUTOMATED COUNT: 12.2 % (ref 11.6–14.4)
GFR SERPLBLD BASED ON 1.73 SQ M-ARVRAT: >60 ML/MIN/{1.73_M2}
GLOBULIN UR ELPH-MCNC: 2.5 G/DL (ref 2–3.5)
GLUCOSE SERPL-MCNC: 95 MG/DL (ref 70–99)
HCT VFR BLD AUTO: 42.1 % (ref 42–52)
HDLC SERPL-MCNC: 62 MG/DL (ref 40–60)
HGB BLD-MCNC: 13.9 G/DL (ref 14–18)
LDLC SERPL CALC-MCNC: 80 MG/DL (ref 70–100)
LIPASE SERPL-CCNC: 37 U/L (ref 5–51)
LYMPHOCYTES # BLD AUTO: 1.2 10*3/UL (ref 1–5)
LYMPHOCYTES NFR BLD AUTO: 37.2 % (ref 20–45)
MCH RBC QN AUTO: 31.6 PG (ref 27–31)
MCHC RBC AUTO-ENTMCNC: 33 G/DL (ref 33–37)
MCV RBC AUTO: 95.7 FL (ref 80–96)
MONOCYTES # BLD AUTO: 0.4 10*3/UL (ref 0.2–1.2)
MONOCYTES NFR BLD AUTO: 12.4 % (ref 3–10)
NEUTROPHILS # BLD AUTO: 1.59 10*3/UL (ref 2–8)
NEUTROPHILS NFR BLD AUTO: 49.2 % (ref 30–85)
NRBC CBCN: 0 % (ref 0–0.7)
OSMOLALITY SERPL CALC.SUM OF ELEC: 293 MOSM/KG (ref 273–304)
PLATELET # BLD AUTO: 230 10*3/UL (ref 130–400)
PMV BLD AUTO: 9.9 FL (ref 9.4–12.4)
POTASSIUM SERPL-SCNC: 3.8 MMOL/L (ref 3.5–5.3)
RBC # BLD AUTO: 4.4 10*6/UL (ref 4.7–6.1)
SODIUM SERPL-SCNC: 141 MMOL/L (ref 135–147)
TRIGL SERPL-MCNC: 67 MG/DL (ref 40–150)
TSH SERPL-ACNC: 4.35 M[IU]/L (ref 0.27–4.2)
VLDLC SERPL-MCNC: 13 MG/DL (ref 5–37)
WBC # BLD AUTO: 3.23 10*3/UL (ref 4.8–10.8)

## 2021-05-15 VITALS
HEIGHT: 71 IN | HEART RATE: 67 BPM | WEIGHT: 148 LBS | DIASTOLIC BLOOD PRESSURE: 64 MMHG | OXYGEN SATURATION: 99 % | BODY MASS INDEX: 20.72 KG/M2 | SYSTOLIC BLOOD PRESSURE: 131 MMHG | RESPIRATION RATE: 16 BRPM

## 2021-05-16 VITALS
DIASTOLIC BLOOD PRESSURE: 73 MMHG | HEART RATE: 57 BPM | BODY MASS INDEX: 21.33 KG/M2 | RESPIRATION RATE: 16 BRPM | SYSTOLIC BLOOD PRESSURE: 137 MMHG | HEIGHT: 71 IN | OXYGEN SATURATION: 99 % | WEIGHT: 152.37 LBS | TEMPERATURE: 98.5 F

## 2021-09-16 RX ORDER — ATORVASTATIN CALCIUM 40 MG/1
TABLET, FILM COATED ORAL
Qty: 90 TABLET | Refills: 0 | Status: SHIPPED | OUTPATIENT
Start: 2021-09-16 | End: 2021-10-15

## 2021-09-22 RX ORDER — FOLIC ACID 1 MG/1
TABLET ORAL
Qty: 90 TABLET | Refills: 0 | Status: SHIPPED | OUTPATIENT
Start: 2021-09-22 | End: 2021-12-23

## 2021-10-05 ENCOUNTER — TRANSCRIBE ORDERS (OUTPATIENT)
Dept: LAB | Facility: HOSPITAL | Age: 66
End: 2021-10-05

## 2021-10-05 ENCOUNTER — LAB (OUTPATIENT)
Dept: LAB | Facility: HOSPITAL | Age: 66
End: 2021-10-05

## 2021-10-05 DIAGNOSIS — Z12.5 PROSTATE CANCER SCREENING: ICD-10-CM

## 2021-10-05 DIAGNOSIS — E03.4 ATROPHY OF THYROID (ACQUIRED): ICD-10-CM

## 2021-10-05 DIAGNOSIS — I25.810 ATHEROSCLEROSIS OF CORONARY ARTERY BYPASS GRAFT WITHOUT ANGINA PECTORIS, UNSPECIFIED WHETHER NATIVE OR TRANSPLANTED HEART: ICD-10-CM

## 2021-10-05 DIAGNOSIS — E78.2 MIXED HYPERLIPIDEMIA: ICD-10-CM

## 2021-10-05 DIAGNOSIS — D72.818 OTHER DECREASED WHITE BLOOD CELL (WBC) COUNT: ICD-10-CM

## 2021-10-05 DIAGNOSIS — K83.01 PRIMARY SCLEROSING CHOLANGITIS: Primary | ICD-10-CM

## 2021-10-05 DIAGNOSIS — K83.01 PRIMARY SCLEROSING CHOLANGITIS: ICD-10-CM

## 2021-10-05 LAB
ALBUMIN SERPL-MCNC: 4.6 G/DL (ref 3.5–5.2)
ALBUMIN/GLOB SERPL: 1.9 G/DL
ALP SERPL-CCNC: 119 U/L (ref 39–117)
ALT SERPL W P-5'-P-CCNC: 23 U/L (ref 1–41)
ANION GAP SERPL CALCULATED.3IONS-SCNC: 10 MMOL/L (ref 5–15)
AST SERPL-CCNC: 32 U/L (ref 1–40)
BASOPHILS # BLD AUTO: 0.03 10*3/MM3 (ref 0–0.2)
BASOPHILS NFR BLD AUTO: 0.8 % (ref 0–1.5)
BILIRUB SERPL-MCNC: 0.5 MG/DL (ref 0–1.2)
BUN SERPL-MCNC: 13 MG/DL (ref 8–23)
BUN/CREAT SERPL: 14.3 (ref 7–25)
CALCIUM SPEC-SCNC: 9.2 MG/DL (ref 8.6–10.5)
CHLORIDE SERPL-SCNC: 104 MMOL/L (ref 98–107)
CHOLEST SERPL-MCNC: 151 MG/DL (ref 0–200)
CO2 SERPL-SCNC: 27 MMOL/L (ref 22–29)
CREAT SERPL-MCNC: 0.91 MG/DL (ref 0.76–1.27)
DEPRECATED RDW RBC AUTO: 43.7 FL (ref 37–54)
EOSINOPHIL # BLD AUTO: 0 10*3/MM3 (ref 0–0.4)
EOSINOPHIL NFR BLD AUTO: 0 % (ref 0.3–6.2)
ERYTHROCYTE [DISTWIDTH] IN BLOOD BY AUTOMATED COUNT: 11.9 % (ref 12.3–15.4)
GFR SERPL CREATININE-BSD FRML MDRD: 84 ML/MIN/1.73
GLOBULIN UR ELPH-MCNC: 2.4 GM/DL
GLUCOSE SERPL-MCNC: 89 MG/DL (ref 65–99)
HCT VFR BLD AUTO: 43.5 % (ref 37.5–51)
HDLC SERPL-MCNC: 56 MG/DL (ref 40–60)
HGB BLD-MCNC: 13.9 G/DL (ref 13–17.7)
IMM GRANULOCYTES # BLD AUTO: 0.01 10*3/MM3 (ref 0–0.05)
IMM GRANULOCYTES NFR BLD AUTO: 0.3 % (ref 0–0.5)
LDLC SERPL CALC-MCNC: 81 MG/DL (ref 0–100)
LDLC/HDLC SERPL: 1.44 {RATIO}
LYMPHOCYTES # BLD AUTO: 1.21 10*3/MM3 (ref 0.7–3.1)
LYMPHOCYTES NFR BLD AUTO: 31.1 % (ref 19.6–45.3)
MCH RBC QN AUTO: 31.3 PG (ref 26.6–33)
MCHC RBC AUTO-ENTMCNC: 32 G/DL (ref 31.5–35.7)
MCV RBC AUTO: 98 FL (ref 79–97)
MONOCYTES # BLD AUTO: 0.46 10*3/MM3 (ref 0.1–0.9)
MONOCYTES NFR BLD AUTO: 11.8 % (ref 5–12)
NEUTROPHILS NFR BLD AUTO: 2.18 10*3/MM3 (ref 1.7–7)
NEUTROPHILS NFR BLD AUTO: 56 % (ref 42.7–76)
NRBC BLD AUTO-RTO: 0 /100 WBC (ref 0–0.2)
PLATELET # BLD AUTO: 229 10*3/MM3 (ref 140–450)
PMV BLD AUTO: 10.2 FL (ref 6–12)
POTASSIUM SERPL-SCNC: 3.9 MMOL/L (ref 3.5–5.2)
PROT SERPL-MCNC: 7 G/DL (ref 6–8.5)
PSA SERPL-MCNC: 3.1 NG/ML (ref 0–4)
RBC # BLD AUTO: 4.44 10*6/MM3 (ref 4.14–5.8)
SODIUM SERPL-SCNC: 141 MMOL/L (ref 136–145)
TRIGL SERPL-MCNC: 73 MG/DL (ref 0–150)
TSH SERPL DL<=0.05 MIU/L-ACNC: 3.62 UIU/ML (ref 0.27–4.2)
VLDLC SERPL-MCNC: 14 MG/DL (ref 5–40)
WBC # BLD AUTO: 3.89 10*3/MM3 (ref 3.4–10.8)

## 2021-10-05 PROCEDURE — 80053 COMPREHEN METABOLIC PANEL: CPT

## 2021-10-05 PROCEDURE — 36415 COLL VENOUS BLD VENIPUNCTURE: CPT

## 2021-10-05 PROCEDURE — G0103 PSA SCREENING: HCPCS

## 2021-10-05 PROCEDURE — 85025 COMPLETE CBC W/AUTO DIFF WBC: CPT

## 2021-10-05 PROCEDURE — 80061 LIPID PANEL: CPT

## 2021-10-05 PROCEDURE — 84443 ASSAY THYROID STIM HORMONE: CPT

## 2021-10-14 PROBLEM — K51.90 ULCERATIVE COLITIS: Status: ACTIVE | Noted: 2021-10-14

## 2021-10-14 PROBLEM — K51.90 COLITIS, ULCERATIVE: Status: ACTIVE | Noted: 2021-10-14

## 2021-10-14 PROBLEM — I10 PRIMARY HYPERTENSION: Status: ACTIVE | Noted: 2021-10-14

## 2021-10-14 PROBLEM — E78.2 MIXED HYPERLIPIDEMIA: Status: ACTIVE | Noted: 2021-10-14

## 2021-10-14 PROBLEM — Z95.1: Status: ACTIVE | Noted: 2021-10-14

## 2021-10-14 PROBLEM — E03.4 HYPOTHYROIDISM DUE TO ACQUIRED ATROPHY OF THYROID: Status: ACTIVE | Noted: 2021-10-14

## 2021-10-14 PROBLEM — K83.01 PSC (PRIMARY SCLEROSING CHOLANGITIS): Status: ACTIVE | Noted: 2021-10-14

## 2021-10-15 ENCOUNTER — OFFICE VISIT (OUTPATIENT)
Dept: INTERNAL MEDICINE | Facility: CLINIC | Age: 66
End: 2021-10-15

## 2021-10-15 VITALS
HEIGHT: 71 IN | SYSTOLIC BLOOD PRESSURE: 130 MMHG | BODY MASS INDEX: 21.7 KG/M2 | WEIGHT: 155 LBS | OXYGEN SATURATION: 97 % | TEMPERATURE: 97.2 F | DIASTOLIC BLOOD PRESSURE: 71 MMHG | HEART RATE: 55 BPM

## 2021-10-15 DIAGNOSIS — E03.4 HYPOTHYROIDISM DUE TO ACQUIRED ATROPHY OF THYROID: ICD-10-CM

## 2021-10-15 DIAGNOSIS — K83.01 PSC (PRIMARY SCLEROSING CHOLANGITIS): ICD-10-CM

## 2021-10-15 DIAGNOSIS — E78.2 MIXED HYPERLIPIDEMIA: ICD-10-CM

## 2021-10-15 DIAGNOSIS — K51.918 ULCERATIVE COLITIS WITH OTHER COMPLICATION, UNSPECIFIED LOCATION (HCC): ICD-10-CM

## 2021-10-15 DIAGNOSIS — I10 PRIMARY HYPERTENSION: ICD-10-CM

## 2021-10-15 DIAGNOSIS — Z95.1 HX OF THREE VESSEL CORONARY ARTERY BYPASS: Primary | ICD-10-CM

## 2021-10-15 PROCEDURE — 90471 IMMUNIZATION ADMIN: CPT | Performed by: INTERNAL MEDICINE

## 2021-10-15 PROCEDURE — 90662 IIV NO PRSV INCREASED AG IM: CPT | Performed by: INTERNAL MEDICINE

## 2021-10-15 PROCEDURE — 99214 OFFICE O/P EST MOD 30 MIN: CPT | Performed by: INTERNAL MEDICINE

## 2021-10-15 RX ORDER — HYDROCHLOROTHIAZIDE 12.5 MG/1
12.5 CAPSULE, GELATIN COATED ORAL DAILY
COMMUNITY
End: 2022-02-16

## 2021-10-15 RX ORDER — EZETIMIBE 10 MG/1
TABLET ORAL
COMMUNITY
End: 2021-12-23

## 2021-10-15 RX ORDER — MULTIVITAMIN
1 CAPSULE ORAL DAILY
COMMUNITY

## 2021-10-15 RX ORDER — ATORVASTATIN CALCIUM 40 MG/1
TABLET, FILM COATED ORAL
COMMUNITY
End: 2021-10-15 | Stop reason: SDUPTHER

## 2021-10-15 RX ORDER — METOPROLOL SUCCINATE 25 MG/1
25 TABLET, EXTENDED RELEASE ORAL DAILY
COMMUNITY

## 2021-10-15 RX ORDER — LEVOTHYROXINE SODIUM 112 UG/1
112 TABLET ORAL DAILY
COMMUNITY
End: 2022-02-16 | Stop reason: SDUPTHER

## 2021-10-15 RX ORDER — ROSUVASTATIN CALCIUM 10 MG/1
TABLET, COATED ORAL
COMMUNITY
End: 2021-10-15

## 2021-10-15 RX ORDER — FOLIC ACID 1 MG/1
TABLET ORAL
COMMUNITY
End: 2021-10-15 | Stop reason: SDUPTHER

## 2021-10-15 RX ORDER — METOPROLOL SUCCINATE 25 MG/1
TABLET, EXTENDED RELEASE ORAL
COMMUNITY
Start: 2021-09-01 | End: 2021-10-15 | Stop reason: SDUPTHER

## 2021-10-15 RX ORDER — ROSUVASTATIN CALCIUM 40 MG/1
40 TABLET, COATED ORAL DAILY
Qty: 90 TABLET | Refills: 1 | Status: SHIPPED | OUTPATIENT
Start: 2021-10-15 | End: 2022-01-19

## 2021-10-15 RX ORDER — SULFASALAZINE 500 MG/1
1000 TABLET ORAL 2 TIMES DAILY
COMMUNITY
End: 2022-03-14

## 2021-10-15 NOTE — PROGRESS NOTES
"Chief Complaint  Follow-up (6 month )    Subjective          Troy Vega presents to [facility]     History of Present Illness    Patient is a pleasant 65-year-old male with underlying coronary artery disease prior bypass, Crohn's disease, hypertension and hyperlipidemia, who is here for routine 6-month follow-up.  We will review his labs and make further recommendations at that time.    Review of Systems   Constitutional: Negative for appetite change, fatigue and fever.   HENT: Negative for congestion and ear pain.    Eyes: Negative for blurred vision.   Respiratory: Negative for cough, chest tightness, shortness of breath and wheezing.    Cardiovascular: Negative for chest pain, palpitations and leg swelling.   Gastrointestinal: Negative for abdominal pain.   Genitourinary: Negative for difficulty urinating, dysuria and hematuria.   Musculoskeletal: Negative for arthralgias and gait problem.   Skin: Negative for skin lesions.   Neurological: Negative for syncope, memory problem and confusion.   Psychiatric/Behavioral: Negative for self-injury and depressed mood.       Objective   Vital Signs:   /71 (BP Location: Left arm, Patient Position: Sitting)   Pulse 55   Temp 97.2 °F (36.2 °C)   Ht 180.3 cm (71\")   Wt 70.3 kg (155 lb)   SpO2 97%   BMI 21.62 kg/m²           Physical Exam  Vitals and nursing note reviewed.   Constitutional:       General: He is not in acute distress.     Appearance: Normal appearance. He is not toxic-appearing.   HENT:      Head: Atraumatic.      Right Ear: External ear normal.      Left Ear: External ear normal.      Nose: Nose normal.      Mouth/Throat:      Mouth: Mucous membranes are moist.   Eyes:      General:         Right eye: No discharge.         Left eye: No discharge.      Extraocular Movements: Extraocular movements intact.      Pupils: Pupils are equal, round, and reactive to light.   Cardiovascular:      Rate and Rhythm: " Normal rate and regular rhythm.      Pulses: Normal pulses.      Heart sounds: Normal heart sounds. No murmur heard.  No gallop.    Pulmonary:      Effort: Pulmonary effort is normal. No respiratory distress.      Breath sounds: No wheezing, rhonchi or rales.   Abdominal:      General: There is no distension.      Palpations: Abdomen is soft. There is no mass.      Tenderness: There is no abdominal tenderness. There is no guarding.   Musculoskeletal:         General: No swelling or tenderness.      Cervical back: No tenderness.      Right lower leg: No edema.      Left lower leg: No edema.   Skin:     General: Skin is warm and dry.      Findings: No rash.   Neurological:      General: No focal deficit present.      Mental Status: He is alert and oriented to person, place, and time. Mental status is at baseline.      Motor: No weakness.      Gait: Gait normal.   Psychiatric:         Mood and Affect: Mood normal.         Thought Content: Thought content normal.          Result Review :   The following data was reviewed by: Rolando Lopez MD on 10/15/2021:                  Assessment and Plan    Diagnoses and all orders for this visit:    1. Hx of three vessel coronary artery bypass (Primary)  Overview:  CAD/CABG 3V (7/11) and sees Dr Brownlee q 6-12 mo.    Patient seen Dr. Refugio Mejia recently, no changes made.  Patient is without any ischemic symptoms, he is stable on beta-blocker and Eliquis for his antianginal regimen.  Continue same.      2. Mixed hyperlipidemia  Overview:  Patient's LDL previously was in the 50-60 ballpark, but they have trended up here over the past year to around 80.  He is on 40 mg of Lipitor, he failed 80 mg previously with elevated liver enzymes.  When he completes his current supply, will try 40 mg of Crestor, and we will bring him back after has been on this for about 2 months given his history of elevated LFTs previously..    Orders:  -     Lipid Panel; Future  -     CK; Future    3.  Hypothyroidism due to acquired atrophy of thyroid  Overview:  TSH is down from 4.3 to 3.6.  Patient is stable on 112 mcg of Synthroid daily, continue same.    Orders:  -     TSH; Future  -     T4, free; Future    4. Primary hypertension  Overview:  Blood pressures well controlled as of 10/21 office visit.  Patient is stable on low-dose metoprolol as well as low-dose diuretic.  Continue same.    Orders:  -     Comprehensive Metabolic Panel; Future    5. PSC (primary sclerosing cholangitis)  Overview:  This was diagnosed in 2018.  He required several stents, and also was subsequently treated for portal vein thrombosis.  There was at least a thought initially he would require permanent stent, but patient has been asymptomatic this regard for year and a half now.  Liver enzymes are stable, we will continue to monitor those closely.    Orders:  -     AFP Tumor Marker; Future  -     Amylase; Future  -     Lipase; Future    6. Ulcerative colitis with other complication, unspecified location (HCC)  Overview:  Patient is followed by Dr. Steiner for this.  He is maintained on sulfasalazine, very stable on this, so should continue same.  Spent 2 years since colonoscopy, and he tells me he is getting set up for repeat one fairly soon.      Other orders  -     rosuvastatin (Crestor) 40 MG tablet; Take 1 tablet by mouth Daily.  Dispense: 90 tablet; Refill: 1  -     Fluzone High-Dose 65+yrs ([id number])       --  --  OLDER NOTES:  ANNUAL PHYSICAL 4/21; d/w all labs; no ischemic sxs; no changes PFSH.  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    LIPIDS at goal with LDL of 74...82 with no changes yet...142 b/c taking PRN due to leg pain, so change to another... and is tolerating lipitor, so will titrate and  blame the R leg pain on his back and eval when he desires...82, but will try 80 mg now...68, but , so need to drop back down and add zetia when LFT's normalize...on 40 Lipitor with qod Zetia=LDL 73 and ALT only 40, so go to qd on Zetia, but no more than 40 of lipitor...69 is best gonna get; LFT's neg 7/18 OV...81 is ok for now...120 in 1/20 b/c sick in hosp past month; no changes needed...74 is goal... 59---> 80 in 4/21, so increase on RTO if same.  --  HTN remains well controlled=ditto 10/20.  --  HYPOTHYROIDISM...increase again 8/17...0.3 despite back on lower dose=112, so stay on it... stable on this dose 1/19...TSH 0.16, so rec try 100 in 7/19...was fine in 11/19, but 23 at 1/20 OV after a month in hosp; I d/w finish up the 112 he has on hand and then back to 100---> up 4/21, but no c/o, so defer RTO.  --  PORTAL VEIN THROMBOSIS occurred after the stenosis below, so it has been over 6 mo as of 10/20 OV and he has not had a restenosis after temp stents removed, so I agree with stopping Eliquis at this time---> no sxs this regard 4/21.  --  PSC CIRRHOSIS=dx'd 10/18 after elevated LFT's noted during eval of upper back pain with jaundice; s/p failed cannulation of CBD 12/21/18...admitted [facility] 12/19 with biliary stricture and had failed ERCP again; required percutaneous drain; concern for chloangiocarcinoma noted; was d/c on the 13 and readmitted on 12/26 and required stents; then 3rd admit for portal vein thrombosis; has f/u with Dr Pena...they need to put in permanent stents sometime, but pt wants to wait as of 4/20 due to virus...stents out 5/20 with stable labs...ditto 10/20 and they didn't call him back and that's ok since LFTs wnl---> AFP/lipase/LFT's neg 4/21.    ULCERATIVE COLITIS since '82 on below and with no recent flare...says no issues 7/19...c-scope 9/19 was neg---> no bowel issues 4/21.  LEUKOPENIA secondary to sulfasalazine...remains stable at 3.6...4.1 in 4/20 with nl diff...3.1 is ok in  10/20---> 3.2 in 4/21 holding.  --  VIT D DEF wnl at 37...later still.  R ULNAR ENTRAPMENT 2/18 = tender, so will try medrol; to ortho if no better... better after tx per chiropractor, so defer as of 7/18 OV.  --  PTOSIS...this is not MG, eval per optho for surgery...here for pre-op clearance...10/3...no CP/SOB, c/w meds, labs noted...s/p 10/13.  HOARSENESS d/w me at 7/18 OV; no concerning meds, no sprays OTC, no A.R., no GERD; will ask ENT to eval for polyps.  --  --  PSA 2.8 (7/2/18)...5.3 (1/10/20) and will address after Pancreatic ca/liver ca ruled out---> 3.9 (7/13/20)---> 3.1 (10/5/2021).  COLON 9/19...wnl... Dr Naranjo (Neg polyps, neg FH; ? q 3-5 yrs due to UC?).  Hep A x1; ? needs Hep B=I told him to d/w GI.  (, 2 kids=son here and girl in [facility]= still working with [employer] as of 10/21 OV).    Follow Up   No follow-ups on file.  Patient was given instructions and counseling regarding his condition or for health maintenance advice. Please see specific information pulled into the AVS if appropriate.

## 2021-11-29 RX ORDER — LEVOTHYROXINE SODIUM 0.1 MG/1
TABLET ORAL
Qty: 90 TABLET | Refills: 0 | Status: SHIPPED | OUTPATIENT
Start: 2021-11-29 | End: 2022-03-17

## 2021-12-23 RX ORDER — FOLIC ACID 1 MG/1
TABLET ORAL
Qty: 90 TABLET | Refills: 0 | Status: SHIPPED | OUTPATIENT
Start: 2021-12-23 | End: 2022-04-01

## 2021-12-23 RX ORDER — EZETIMIBE 10 MG/1
TABLET ORAL
Qty: 90 TABLET | Refills: 0 | Status: SHIPPED | OUTPATIENT
Start: 2021-12-23 | End: 2022-04-01

## 2022-01-19 ENCOUNTER — TELEPHONE (OUTPATIENT)
Dept: INTERNAL MEDICINE | Facility: CLINIC | Age: 67
End: 2022-01-19

## 2022-01-19 RX ORDER — ATORVASTATIN CALCIUM 80 MG/1
80 TABLET, FILM COATED ORAL DAILY
Qty: 90 TABLET | Refills: 1 | Status: SHIPPED | OUTPATIENT
Start: 2022-01-19 | End: 2022-06-09

## 2022-01-19 NOTE — TELEPHONE ENCOUNTER
Let him know that I sent 80 mg atorvastatin over to his pharmacy, this would be equivalent to 40 mg of the Crestor that we tried.

## 2022-02-11 ENCOUNTER — LAB (OUTPATIENT)
Dept: LAB | Facility: HOSPITAL | Age: 67
End: 2022-02-11

## 2022-02-11 DIAGNOSIS — I10 PRIMARY HYPERTENSION: ICD-10-CM

## 2022-02-11 DIAGNOSIS — K83.01 PSC (PRIMARY SCLEROSING CHOLANGITIS): ICD-10-CM

## 2022-02-11 DIAGNOSIS — E78.2 MIXED HYPERLIPIDEMIA: ICD-10-CM

## 2022-02-11 DIAGNOSIS — E03.4 HYPOTHYROIDISM DUE TO ACQUIRED ATROPHY OF THYROID: ICD-10-CM

## 2022-02-11 LAB
ALBUMIN SERPL-MCNC: 4.6 G/DL (ref 3.5–5.2)
ALBUMIN/GLOB SERPL: 2 G/DL
ALP SERPL-CCNC: 104 U/L (ref 39–117)
ALPHA-FETOPROTEIN: 7.66 NG/ML (ref 0–8.3)
ALT SERPL W P-5'-P-CCNC: 24 U/L (ref 1–41)
AMYLASE SERPL-CCNC: 42 U/L (ref 28–100)
ANION GAP SERPL CALCULATED.3IONS-SCNC: 11 MMOL/L (ref 5–15)
AST SERPL-CCNC: 33 U/L (ref 1–40)
BILIRUB SERPL-MCNC: 0.7 MG/DL (ref 0–1.2)
BUN SERPL-MCNC: 13 MG/DL (ref 8–23)
BUN/CREAT SERPL: 14 (ref 7–25)
CALCIUM SPEC-SCNC: 9.1 MG/DL (ref 8.6–10.5)
CHLORIDE SERPL-SCNC: 102 MMOL/L (ref 98–107)
CHOLEST SERPL-MCNC: 140 MG/DL (ref 0–200)
CK SERPL-CCNC: 298 U/L (ref 20–200)
CO2 SERPL-SCNC: 27 MMOL/L (ref 22–29)
CREAT SERPL-MCNC: 0.93 MG/DL (ref 0.76–1.27)
GFR SERPL CREATININE-BSD FRML MDRD: 82 ML/MIN/1.73
GLOBULIN UR ELPH-MCNC: 2.3 GM/DL
GLUCOSE SERPL-MCNC: 88 MG/DL (ref 65–99)
HDLC SERPL-MCNC: 52 MG/DL (ref 40–60)
LDLC SERPL CALC-MCNC: 77 MG/DL (ref 0–100)
LDLC/HDLC SERPL: 1.49 {RATIO}
LIPASE SERPL-CCNC: 24 U/L (ref 13–60)
POTASSIUM SERPL-SCNC: 3.9 MMOL/L (ref 3.5–5.2)
PROT SERPL-MCNC: 6.9 G/DL (ref 6–8.5)
SODIUM SERPL-SCNC: 140 MMOL/L (ref 136–145)
T4 FREE SERPL-MCNC: 1.43 NG/DL (ref 0.93–1.7)
TRIGL SERPL-MCNC: 52 MG/DL (ref 0–150)
TSH SERPL DL<=0.05 MIU/L-ACNC: 4.82 UIU/ML (ref 0.27–4.2)
VLDLC SERPL-MCNC: 11 MG/DL (ref 5–40)

## 2022-02-11 PROCEDURE — 84439 ASSAY OF FREE THYROXINE: CPT

## 2022-02-11 PROCEDURE — 80061 LIPID PANEL: CPT

## 2022-02-11 PROCEDURE — 83690 ASSAY OF LIPASE: CPT

## 2022-02-11 PROCEDURE — 82550 ASSAY OF CK (CPK): CPT

## 2022-02-11 PROCEDURE — 84443 ASSAY THYROID STIM HORMONE: CPT

## 2022-02-11 PROCEDURE — 82150 ASSAY OF AMYLASE: CPT

## 2022-02-11 PROCEDURE — 82105 ALPHA-FETOPROTEIN SERUM: CPT

## 2022-02-11 PROCEDURE — 36415 COLL VENOUS BLD VENIPUNCTURE: CPT

## 2022-02-11 PROCEDURE — 80053 COMPREHEN METABOLIC PANEL: CPT

## 2022-02-16 ENCOUNTER — OFFICE VISIT (OUTPATIENT)
Dept: INTERNAL MEDICINE | Facility: CLINIC | Age: 67
End: 2022-02-16

## 2022-02-16 VITALS
DIASTOLIC BLOOD PRESSURE: 74 MMHG | OXYGEN SATURATION: 98 % | HEIGHT: 71 IN | BODY MASS INDEX: 22.46 KG/M2 | TEMPERATURE: 98.1 F | WEIGHT: 160.4 LBS | HEART RATE: 56 BPM | SYSTOLIC BLOOD PRESSURE: 149 MMHG

## 2022-02-16 DIAGNOSIS — E03.4 HYPOTHYROIDISM DUE TO ACQUIRED ATROPHY OF THYROID: ICD-10-CM

## 2022-02-16 DIAGNOSIS — K83.01 PSC (PRIMARY SCLEROSING CHOLANGITIS): ICD-10-CM

## 2022-02-16 DIAGNOSIS — K51.819 OTHER ULCERATIVE COLITIS WITH COMPLICATION: ICD-10-CM

## 2022-02-16 DIAGNOSIS — I10 PRIMARY HYPERTENSION: ICD-10-CM

## 2022-02-16 DIAGNOSIS — E78.2 MIXED HYPERLIPIDEMIA: ICD-10-CM

## 2022-02-16 DIAGNOSIS — Z00.00 WELL ADULT EXAM: ICD-10-CM

## 2022-02-16 DIAGNOSIS — Z95.1 HX OF THREE VESSEL CORONARY ARTERY BYPASS: Primary | ICD-10-CM

## 2022-02-16 PROCEDURE — 99214 OFFICE O/P EST MOD 30 MIN: CPT | Performed by: INTERNAL MEDICINE

## 2022-02-16 RX ORDER — ASPIRIN 81 MG/1
81 TABLET ORAL DAILY
COMMUNITY

## 2022-02-16 NOTE — ASSESSMENT & PLAN NOTE
Blood pressures with reasonable control as of 2/22 office visit.  Patient is stable on low-dose metoprolol only.  Continue same.

## 2022-02-16 NOTE — ASSESSMENT & PLAN NOTE
Patient is without any ischemic symptoms, he is stable on beta-blocker and baby aspirin for his antianginal regimen.  Continue same as of 2/22 office visit

## 2022-02-16 NOTE — ASSESSMENT & PLAN NOTE
TSH is up to 4.8 as of 2/22 office visit.  Patient with no significant symptoms this regards, so I agree with sticking with 100 mcg only for now.  We will repeat on return to office.

## 2022-02-16 NOTE — ASSESSMENT & PLAN NOTE
Liver enzymes as well as pancreatic enzymes are all normal as of 2/22 office visit.  Patient no symptoms this regards.

## 2022-02-16 NOTE — PROGRESS NOTES
"Chief Complaint  Hyperlipidemia and Follow-up (Pt is still having bilateral knee pain and it is hard to get up and down. He feels that it is coming from the cholesterol meds)    Subjective          Troy Vega presents to [facility]     History of Present Illness  Patient is a pleasant 65-year-old male with underlying coronary artery disease prior bypass, Crohn's disease, hypertension and hyperlipidemia, who is here 2/22 for routine 4-month follow-up.  We will review his labs and make further recommendations at that time.    Review of Systems   Constitutional: Negative for appetite change, fatigue and fever.   HENT: Negative for congestion and ear pain.    Eyes: Negative for blurred vision.   Respiratory: Negative for cough, chest tightness, shortness of breath and wheezing.    Cardiovascular: Negative for chest pain, palpitations and leg swelling.   Gastrointestinal: Negative for abdominal pain.   Genitourinary: Negative for difficulty urinating, dysuria and hematuria.   Musculoskeletal: Negative for arthralgias and gait problem.   Skin: Negative for skin lesions.   Neurological: Negative for syncope, memory problem and confusion.   Psychiatric/Behavioral: Negative for self-injury and depressed mood.       Objective   Vital Signs:   /74   Pulse 56   Temp 98.1 °F (36.7 °C)   Ht 180.3 cm (70.98\")   Wt 72.8 kg (160 lb 6.4 oz)   SpO2 98%   BMI 22.38 kg/m²           Physical Exam  Vitals and nursing note reviewed.   Constitutional:       General: He is not in acute distress.     Appearance: Normal appearance. He is not toxic-appearing.   HENT:      Head: Atraumatic.      Right Ear: External ear normal.      Left Ear: External ear normal.      Nose: Nose normal.      Mouth/Throat:      Mouth: Mucous membranes are moist.   Eyes:      General:         Right eye: No discharge.         Left eye: No discharge.      Extraocular Movements: Extraocular movements intact.      " Pupils: Pupils are equal, round, and reactive to light.   Cardiovascular:      Rate and Rhythm: Normal rate and regular rhythm.      Pulses: Normal pulses.      Heart sounds: Normal heart sounds. No murmur heard.  No gallop.    Pulmonary:      Effort: Pulmonary effort is normal. No respiratory distress.      Breath sounds: No wheezing, rhonchi or rales.   Abdominal:      General: There is no distension.      Palpations: Abdomen is soft. There is no mass.      Tenderness: There is no abdominal tenderness. There is no guarding.   Musculoskeletal:         General: No swelling or tenderness.      Cervical back: No tenderness.      Right lower leg: No edema.      Left lower leg: No edema.   Skin:     General: Skin is warm and dry.      Findings: No rash.   Neurological:      General: No focal deficit present.      Mental Status: He is alert and oriented to person, place, and time. Mental status is at baseline.      Motor: No weakness.      Gait: Gait normal.   Psychiatric:         Mood and Affect: Mood normal.         Thought Content: Thought content normal.          Result Review :   The following data was reviewed by: Rolando Lopez MD on 10/15/2021:                  Assessment and Plan    Diagnoses and all orders for this visit:    1. Hx of three vessel coronary artery bypass (Primary)  Overview:  CAD/CABG 3V (7/11) and sees Dr Brownlee q 6-12 mo.        Assessment & Plan:  Patient is without any ischemic symptoms, he is stable on beta-blocker and baby aspirin for his antianginal regimen.  Continue same as of 2/22 office visit      2. PSC (primary sclerosing cholangitis)  Overview:  This was diagnosed in 2018.  He required several stents, and also was subsequently treated for portal vein thrombosis.  There was at least a thought initially he would require permanent stent, but patient has been asymptomatic this regard since early 2020.    Assessment & Plan:  Liver enzymes as well as pancreatic enzymes are all normal as  of 2/22 office visit.  Patient no symptoms this regards.      3. Primary hypertension  Assessment & Plan:  Blood pressures with reasonable control as of 2/22 office visit.  Patient is stable on low-dose metoprolol only.  Continue same.      Orders:  -     Comprehensive Metabolic Panel; Future    4. Hypothyroidism due to acquired atrophy of thyroid  Assessment & Plan:  TSH is up to 4.8 as of 2/22 office visit.  Patient with no significant symptoms this regards, so I agree with sticking with 100 mcg only for now.  We will repeat on return to office.    Orders:  -     TSH; Future  -     T4, free; Future    5. Mixed hyperlipidemia  Assessment & Plan:  Patient's LDL previously was in the 50-60 ballpark, but they have trended up here over the past year to around 80.  He is on 40 mg of Lipitor, he failed 80 mg previously with elevated liver enzymes.  When he completes his current supply, will try 40 mg of Crestor---> patient did not tolerate Crestor, he went back to 80 mg of Lipitor, but is having myalgias with that as well.  Fortunately his his LFTs have not bumped on this.  He is still on Zetia.  We will give him a drug holiday for several weeks from the statin, and he will titrate his co-Q10 to 200 mg. If not tolerating this, may need to talk with Dr. Refugio Mejia about Repatha therapy.    Orders:  -     Lipid Panel; Future  -     CK; Future  -     CK; Future  -     Aldolase; Future  -     Sedimentation Rate; Future    6. Other ulcerative colitis with complication (HCC)  Overview:  Patient is followed by Dr. Naranjo for this.  He is maintained on sulfasalazine, very stable on this, so should continue same.  It's cynthia 2 years since colonoscopy, and he is apparently set up for repeat one soon.    Orders:  -     CBC & Differential; Future    7. Well adult exam  -     Hepatitis C Antibody; Future     --  --  OLDER NOTES:  ANNUAL PHYSICAL 4/21; d/w all labs; no ischemic sxs; no changes PFSH.  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    LIPIDS at goal with LDL of 74...82 with no changes yet...142 b/c taking PRN due to leg pain, so change to another... and is tolerating lipitor, so will titrate and blame the R leg pain on his back and eval when he desires...82, but will try 80 mg now...68, but , so need to drop back down and add zetia when LFT's normalize...on 40 Lipitor with qod Zetia=LDL 73 and ALT only 40, so go to qd on Zetia, but no more than 40 of lipitor...69 is best gonna get; LFT's neg 7/18 OV...81 is ok for now...120 in 1/20 b/c sick in hosp past month; no changes needed...74 is goal... 59---> 80 in 4/21, so increase on RTO if same.  --  HTN remains well controlled=ditto 10/20.  --  HYPOTHYROIDISM...increase again 8/17...0.3 despite back on lower dose=112, so stay on it... stable on this dose 1/19...TSH 0.16, so rec try 100 in 7/19...was fine in 11/19, but 23 at 1/20 OV after a month in hosp; I d/w finish up the 112 he has on hand and then back to 100---> up 4/21, but no c/o, so defer RTO.  --  PORTAL VEIN THROMBOSIS occurred after the stenosis below, so it has been over 6 mo as of 10/20 OV and he has not had a restenosis after temp stents removed, so I agree with stopping Eliquis at this time---> no sxs this regard 4/21.  --  PSC CIRRHOSIS=dx'd 10/18 after elevated LFT's noted during eval of upper back pain with jaundice; s/p failed cannulation of CBD 12/21/18...admitted [facility] 12/19 with biliary stricture and had failed ERCP again; required percutaneous drain; concern for chloangiocarcinoma noted; was d/c on the 13 and readmitted on 12/26 and required stents; then 3rd admit for portal vein thrombosis; has f/u with Dr Pena...they need to put in permanent stents sometime, but pt wants to wait  as of 4/20 due to virus...stents out 5/20 with stable labs...ditto 10/20 and they didn't call him back and that's ok since LFTs wnl---> AFP/lipase/LFT's neg 4/21.    ULCERATIVE COLITIS since '82 on below and with no recent flare...says no issues 7/19...c-scope 9/19 was neg---> no bowel issues 4/21.  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  --  VIT D DEF wnl at 37...later still.  R ULNAR ENTRAPMENT 2/18 = tender, so will try medrol; to ortho if no better... better after tx per chiropractor, so defer as of 7/18 OV.  --  PTOSIS...this is not MG, eval per optho for surgery...here for pre-op clearance...10/3...no CP/SOB, c/w meds, labs noted...s/p 10/13.  HOARSENESS d/w me at 7/18 OV; no concerning meds, no sprays OTC, no A.R., no GERD; will ask ENT to eval for polyps.  --  --  PSA 2.8 (7/2/18)...5.3 (1/10/20) and will address after Pancreatic ca/liver ca ruled out---> 3.9 (7/13/20)---> 3.1 (10/5/2021).  COLON 9/19...wnl... Dr Naranjo (Neg polyps, neg FH; ? q 3-5 yrs due to UC?).  Hep A x1; ? needs Hep B=I told him to d/w GI.  (, 2 kids=son here and girl in [facility]= still working with [employer] as of 10/21 OV).    Follow Up   Return in about 4 months (around 6/16/2022).  Patient was given instructions and counseling regarding his condition or for health maintenance advice. Please see specific information pulled into the AVS if appropriate.

## 2022-02-16 NOTE — ASSESSMENT & PLAN NOTE
Patient's LDL previously was in the 50-60 ballpark, but they have trended up here over the past year to around 80.  He is on 40 mg of Lipitor, he failed 80 mg previously with elevated liver enzymes.  When he completes his current supply, will try 40 mg of Crestor---> patient did not tolerate Crestor, he went back to 80 mg of Lipitor, but is having myalgias with that as well.  Fortunately his his LFTs have not bumped on this.  He is still on Zetia.  We will give him a drug holiday for several weeks from the statin, and he will titrate his co-Q10 to 200 mg. If not tolerating this, may need to talk with Dr. Refugio Mejia about Repatha therapy.

## 2022-02-25 ENCOUNTER — TELEPHONE (OUTPATIENT)
Dept: INTERNAL MEDICINE | Facility: CLINIC | Age: 67
End: 2022-02-25

## 2022-03-14 RX ORDER — SULFASALAZINE 500 MG/1
TABLET ORAL
Qty: 360 TABLET | Refills: 0 | Status: SHIPPED | OUTPATIENT
Start: 2022-03-14 | End: 2022-06-09 | Stop reason: SDUPTHER

## 2022-03-17 RX ORDER — LEVOTHYROXINE SODIUM 0.1 MG/1
100 TABLET ORAL DAILY
Qty: 90 TABLET | Refills: 1 | Status: SHIPPED | OUTPATIENT
Start: 2022-03-17 | End: 2022-09-23

## 2022-04-01 RX ORDER — FOLIC ACID 1 MG/1
1 TABLET ORAL DAILY
Qty: 90 TABLET | Refills: 1 | Status: SHIPPED | OUTPATIENT
Start: 2022-04-01 | End: 2022-10-12

## 2022-04-01 RX ORDER — EZETIMIBE 10 MG/1
10 TABLET ORAL DAILY
Qty: 90 TABLET | Refills: 1 | Status: SHIPPED | OUTPATIENT
Start: 2022-04-01 | End: 2022-10-12

## 2022-04-15 ENCOUNTER — LAB (OUTPATIENT)
Dept: LAB | Facility: HOSPITAL | Age: 67
End: 2022-04-15

## 2022-04-15 ENCOUNTER — TRANSCRIBE ORDERS (OUTPATIENT)
Dept: LAB | Facility: HOSPITAL | Age: 67
End: 2022-04-15

## 2022-04-15 DIAGNOSIS — I25.10 DISEASE OF CARDIOVASCULAR SYSTEM: ICD-10-CM

## 2022-04-15 DIAGNOSIS — E55.9 VITAMIN D DEFICIENCY: ICD-10-CM

## 2022-04-15 DIAGNOSIS — E78.5 HYPERLIPIDEMIA, UNSPECIFIED HYPERLIPIDEMIA TYPE: ICD-10-CM

## 2022-04-15 DIAGNOSIS — E78.2 MIXED HYPERLIPIDEMIA: ICD-10-CM

## 2022-04-15 DIAGNOSIS — E78.5 HYPERLIPIDEMIA, UNSPECIFIED HYPERLIPIDEMIA TYPE: Primary | ICD-10-CM

## 2022-04-15 LAB
25(OH)D3 SERPL-MCNC: 29.2 NG/ML (ref 30–100)
CHOLEST SERPL-MCNC: 279 MG/DL (ref 0–200)
CK SERPL-CCNC: 431 U/L (ref 20–200)
ERYTHROCYTE [SEDIMENTATION RATE] IN BLOOD: 6 MM/HR (ref 0–20)
HDLC SERPL-MCNC: 59 MG/DL (ref 40–60)
LDLC SERPL CALC-MCNC: 206 MG/DL (ref 0–100)
LDLC/HDLC SERPL: 3.44 {RATIO}
TRIGL SERPL-MCNC: 84 MG/DL (ref 0–150)
VLDLC SERPL-MCNC: 14 MG/DL (ref 5–40)

## 2022-04-15 PROCEDURE — 36415 COLL VENOUS BLD VENIPUNCTURE: CPT

## 2022-04-15 PROCEDURE — 82085 ASSAY OF ALDOLASE: CPT

## 2022-04-15 PROCEDURE — 85652 RBC SED RATE AUTOMATED: CPT

## 2022-04-15 PROCEDURE — 82306 VITAMIN D 25 HYDROXY: CPT

## 2022-04-15 PROCEDURE — 82550 ASSAY OF CK (CPK): CPT

## 2022-04-15 PROCEDURE — 80061 LIPID PANEL: CPT

## 2022-04-18 LAB — ALDOLASE SERPL-CCNC: 5.1 U/L (ref 3.3–10.3)

## 2022-06-06 ENCOUNTER — LAB (OUTPATIENT)
Dept: LAB | Facility: HOSPITAL | Age: 67
End: 2022-06-06

## 2022-06-06 DIAGNOSIS — I10 PRIMARY HYPERTENSION: ICD-10-CM

## 2022-06-06 DIAGNOSIS — E03.4 HYPOTHYROIDISM DUE TO ACQUIRED ATROPHY OF THYROID: ICD-10-CM

## 2022-06-06 DIAGNOSIS — E78.2 MIXED HYPERLIPIDEMIA: ICD-10-CM

## 2022-06-06 DIAGNOSIS — Z00.00 WELL ADULT EXAM: ICD-10-CM

## 2022-06-06 DIAGNOSIS — K51.819 OTHER ULCERATIVE COLITIS WITH COMPLICATION: ICD-10-CM

## 2022-06-06 LAB
ALBUMIN SERPL-MCNC: 4.4 G/DL (ref 3.5–5.2)
ALBUMIN/GLOB SERPL: 1.6 G/DL
ALP SERPL-CCNC: 75 U/L (ref 39–117)
ALT SERPL W P-5'-P-CCNC: 19 U/L (ref 1–41)
ANION GAP SERPL CALCULATED.3IONS-SCNC: 11.1 MMOL/L (ref 5–15)
AST SERPL-CCNC: 25 U/L (ref 1–40)
BASOPHILS # BLD AUTO: 0.03 10*3/MM3 (ref 0–0.2)
BASOPHILS NFR BLD AUTO: 0.9 % (ref 0–1.5)
BILIRUB SERPL-MCNC: 0.6 MG/DL (ref 0–1.2)
BUN SERPL-MCNC: 14 MG/DL (ref 8–23)
BUN/CREAT SERPL: 13.1 (ref 7–25)
CALCIUM SPEC-SCNC: 9.2 MG/DL (ref 8.6–10.5)
CHLORIDE SERPL-SCNC: 103 MMOL/L (ref 98–107)
CHOLEST SERPL-MCNC: 141 MG/DL (ref 0–200)
CK SERPL-CCNC: 104 U/L (ref 20–200)
CO2 SERPL-SCNC: 24.9 MMOL/L (ref 22–29)
CREAT SERPL-MCNC: 1.07 MG/DL (ref 0.76–1.27)
DEPRECATED RDW RBC AUTO: 39.9 FL (ref 37–54)
EGFRCR SERPLBLD CKD-EPI 2021: 77 ML/MIN/1.73
EOSINOPHIL # BLD AUTO: 0 10*3/MM3 (ref 0–0.4)
EOSINOPHIL NFR BLD AUTO: 0 % (ref 0.3–6.2)
ERYTHROCYTE [DISTWIDTH] IN BLOOD BY AUTOMATED COUNT: 11.7 % (ref 12.3–15.4)
GLOBULIN UR ELPH-MCNC: 2.7 GM/DL
GLUCOSE SERPL-MCNC: 83 MG/DL (ref 65–99)
HCT VFR BLD AUTO: 39.7 % (ref 37.5–51)
HCV AB SER DONR QL: NORMAL
HDLC SERPL-MCNC: 43 MG/DL (ref 40–60)
HGB BLD-MCNC: 13.1 G/DL (ref 13–17.7)
IMM GRANULOCYTES # BLD AUTO: 0.01 10*3/MM3 (ref 0–0.05)
IMM GRANULOCYTES NFR BLD AUTO: 0.3 % (ref 0–0.5)
LDLC SERPL CALC-MCNC: 76 MG/DL (ref 0–100)
LDLC/HDLC SERPL: 1.71 {RATIO}
LYMPHOCYTES # BLD AUTO: 1.2 10*3/MM3 (ref 0.7–3.1)
LYMPHOCYTES NFR BLD AUTO: 34.1 % (ref 19.6–45.3)
MCH RBC QN AUTO: 30.9 PG (ref 26.6–33)
MCHC RBC AUTO-ENTMCNC: 33 G/DL (ref 31.5–35.7)
MCV RBC AUTO: 93.6 FL (ref 79–97)
MONOCYTES # BLD AUTO: 0.4 10*3/MM3 (ref 0.1–0.9)
MONOCYTES NFR BLD AUTO: 11.4 % (ref 5–12)
NEUTROPHILS NFR BLD AUTO: 1.88 10*3/MM3 (ref 1.7–7)
NEUTROPHILS NFR BLD AUTO: 53.3 % (ref 42.7–76)
NRBC BLD AUTO-RTO: 0 /100 WBC (ref 0–0.2)
PLATELET # BLD AUTO: 277 10*3/MM3 (ref 140–450)
PMV BLD AUTO: 9.8 FL (ref 6–12)
POTASSIUM SERPL-SCNC: 4.2 MMOL/L (ref 3.5–5.2)
PROT SERPL-MCNC: 7.1 G/DL (ref 6–8.5)
RBC # BLD AUTO: 4.24 10*6/MM3 (ref 4.14–5.8)
SODIUM SERPL-SCNC: 139 MMOL/L (ref 136–145)
T4 FREE SERPL-MCNC: 1.74 NG/DL (ref 0.93–1.7)
TRIGL SERPL-MCNC: 123 MG/DL (ref 0–150)
TSH SERPL DL<=0.05 MIU/L-ACNC: 1.55 UIU/ML (ref 0.27–4.2)
VLDLC SERPL-MCNC: 22 MG/DL (ref 5–40)
WBC NRBC COR # BLD: 3.52 10*3/MM3 (ref 3.4–10.8)

## 2022-06-06 PROCEDURE — 82550 ASSAY OF CK (CPK): CPT

## 2022-06-06 PROCEDURE — 80053 COMPREHEN METABOLIC PANEL: CPT

## 2022-06-06 PROCEDURE — 85025 COMPLETE CBC W/AUTO DIFF WBC: CPT

## 2022-06-06 PROCEDURE — 36415 COLL VENOUS BLD VENIPUNCTURE: CPT

## 2022-06-06 PROCEDURE — 86803 HEPATITIS C AB TEST: CPT

## 2022-06-06 PROCEDURE — 84443 ASSAY THYROID STIM HORMONE: CPT

## 2022-06-06 PROCEDURE — 80061 LIPID PANEL: CPT

## 2022-06-06 PROCEDURE — 84439 ASSAY OF FREE THYROXINE: CPT

## 2022-06-09 ENCOUNTER — OFFICE VISIT (OUTPATIENT)
Dept: INTERNAL MEDICINE | Facility: CLINIC | Age: 67
End: 2022-06-09

## 2022-06-09 VITALS
HEART RATE: 70 BPM | BODY MASS INDEX: 21.56 KG/M2 | WEIGHT: 154 LBS | HEIGHT: 71 IN | TEMPERATURE: 97.3 F | SYSTOLIC BLOOD PRESSURE: 134 MMHG | OXYGEN SATURATION: 95 % | DIASTOLIC BLOOD PRESSURE: 78 MMHG

## 2022-06-09 DIAGNOSIS — K51.919 ULCERATIVE COLITIS WITH COMPLICATION, UNSPECIFIED LOCATION: ICD-10-CM

## 2022-06-09 DIAGNOSIS — K83.01 PSC (PRIMARY SCLEROSING CHOLANGITIS): ICD-10-CM

## 2022-06-09 DIAGNOSIS — Z95.1 HX OF THREE VESSEL CORONARY ARTERY BYPASS: Primary | ICD-10-CM

## 2022-06-09 DIAGNOSIS — E03.4 HYPOTHYROIDISM DUE TO ACQUIRED ATROPHY OF THYROID: ICD-10-CM

## 2022-06-09 DIAGNOSIS — I10 PRIMARY HYPERTENSION: ICD-10-CM

## 2022-06-09 DIAGNOSIS — E78.2 MIXED HYPERLIPIDEMIA: ICD-10-CM

## 2022-06-09 DIAGNOSIS — D50.9 IRON DEFICIENCY ANEMIA, UNSPECIFIED IRON DEFICIENCY ANEMIA TYPE: ICD-10-CM

## 2022-06-09 PROCEDURE — 90677 PCV20 VACCINE IM: CPT | Performed by: INTERNAL MEDICINE

## 2022-06-09 PROCEDURE — 99214 OFFICE O/P EST MOD 30 MIN: CPT | Performed by: INTERNAL MEDICINE

## 2022-06-09 PROCEDURE — G0009 ADMIN PNEUMOCOCCAL VACCINE: HCPCS | Performed by: INTERNAL MEDICINE

## 2022-06-09 RX ORDER — BEMPEDOIC ACID 180 MG/1
1 TABLET, FILM COATED ORAL DAILY
COMMUNITY
Start: 2022-05-25

## 2022-06-09 RX ORDER — SULFASALAZINE 500 MG/1
1000 TABLET ORAL 2 TIMES DAILY
Qty: 360 TABLET | Refills: 1 | Status: SHIPPED | OUTPATIENT
Start: 2022-06-09 | End: 2022-06-22

## 2022-06-09 NOTE — PROGRESS NOTES
"Chief Complaint  Hypothyroidism (Pt is here for routine follow up. Pt reports no new issues or concerns. )    Subjective          Troy Vega presents to [facility]     History of Present Illness  Patient is a pleasant 65-year-old male with underlying coronary artery disease prior bypass, Crohn's disease, hypertension and hyperlipidemia, who is here 6/22 for routine 4-month follow-up.  We will review his labs and make further recommendations at that time.    Review of Systems   Constitutional: Negative for appetite change, fatigue and fever.   HENT: Negative for congestion and ear pain.    Eyes: Negative for blurred vision.   Respiratory: Negative for cough, chest tightness, shortness of breath and wheezing.    Cardiovascular: Negative for chest pain, palpitations and leg swelling.   Gastrointestinal: Negative for abdominal pain.   Genitourinary: Negative for difficulty urinating, dysuria and hematuria.   Musculoskeletal: Negative for arthralgias and gait problem.   Skin: Negative for skin lesions.   Neurological: Negative for syncope, memory problem and confusion.   Psychiatric/Behavioral: Negative for self-injury and depressed mood.       Objective   Vital Signs:   /78   Pulse 70   Temp 97.3 °F (36.3 °C)   Ht 180.3 cm (70.98\")   Wt 69.9 kg (154 lb)   SpO2 95%   BMI 21.49 kg/m²           Physical Exam  Vitals and nursing note reviewed.   Constitutional:       General: He is not in acute distress.     Appearance: Normal appearance. He is not toxic-appearing.   HENT:      Head: Atraumatic.      Right Ear: External ear normal.      Left Ear: External ear normal.      Nose: Nose normal.      Mouth/Throat:      Mouth: Mucous membranes are moist.   Eyes:      General:         Right eye: No discharge.         Left eye: No discharge.      Extraocular Movements: Extraocular movements intact.      Pupils: Pupils are equal, round, and reactive to light.   Cardiovascular:    "   Rate and Rhythm: Normal rate and regular rhythm.      Pulses: Normal pulses.      Heart sounds: Normal heart sounds. No murmur heard.    No gallop.   Pulmonary:      Effort: Pulmonary effort is normal. No respiratory distress.      Breath sounds: No wheezing, rhonchi or rales.   Abdominal:      General: There is no distension.      Palpations: Abdomen is soft. There is no mass.      Tenderness: There is no abdominal tenderness. There is no guarding.   Musculoskeletal:         General: No swelling or tenderness.      Cervical back: No tenderness.      Right lower leg: No edema.      Left lower leg: No edema.   Skin:     General: Skin is warm and dry.      Findings: No rash.   Neurological:      General: No focal deficit present.      Mental Status: He is alert and oriented to person, place, and time. Mental status is at baseline.      Motor: No weakness.      Gait: Gait normal.   Psychiatric:         Mood and Affect: Mood normal.         Thought Content: Thought content normal.          Result Review :   The following data was reviewed by: Rolando Lopez MD on 10/15/2021:                  Assessment and Plan    Diagnoses and all orders for this visit:    1. Hx of three vessel coronary artery bypass (Primary)  Overview:  CAD/CABG 3V (7/11) and sees Dr Brownlee q 6-12 mo.        Assessment & Plan:  Patient with no significant ischemic symptoms, no chest pain or pressure.  He does get dyspneic some with activities.  He is stable to continue with low-dose beta-blocker and baby aspirin for his antianginal regimen.      2. PSC (primary sclerosing cholangitis)  Overview:  This was diagnosed in 2018.  He required several stents, and also was subsequently treated for portal vein thrombosis.  There was at least a thought initially he would require permanent stent, but patient has been asymptomatic this regard since early 2020.      3. Primary hypertension  Assessment & Plan:  Blood pressure was normal on my repeat as of his  6/22 office visit.  Patient stable on low-dose metoprolol only, continue same going forward.    Orders:  -     Comprehensive Metabolic Panel; Future    4. Hypothyroidism due to acquired atrophy of thyroid  Assessment & Plan:  TSH has corrected itself, it is down to 1.5 as of his 6/22 office visit.  Patient stable to stick with 100 mcg Synthroid daily.    Orders:  -     TSH+Free T4; Future    5. Mixed hyperlipidemia  Assessment & Plan:  LDL jumped from 77 to 206 off of high-dose Lipitor.  Dr. Refugio Mejia started him on Nexletol 180 mg, and his LDL is right back down to 76.  This is with Zetia on board as well.  Patient appears to be tolerating this, continue same plan of care.    Orders:  -     Lipid Panel; Future    6. Ulcerative colitis with complication, unspecified location (HCC)  Overview:  Patient is followed by Dr. Narnajo for this.        Assessment & Plan:  He is maintained on sulfasalazine, very stable on this, so should continue same.  It's cynthia 2 years since colonoscopy, and he is apparently set up for repeat one soon.---> That was as of his 2/22 office visit.  Still pending as of 6/22 office visit, patient is aware he is past due.  No new symptoms, continues to sulfasalazine, refill given today.    Orders:  -     Vitamin B12 anemia; Future  -     Folate anemia; Future    7. Iron deficiency anemia, unspecified iron deficiency anemia type  -     CBC & Differential; Future  -     Ferritin; Future  -     Iron Profile; Future    Other orders  -     sulfaSALAzine (AZULFIDINE) 500 MG tablet; Take 2 tablets by mouth 2 (Two) Times a Day.  Dispense: 360 tablet; Refill: 1  -     Pneumococcal Conjugate Vaccine 20-Valent (PCV20)     --  --  OLDER NOTES:  ANNUAL PHYSICAL 4/21; d/w all labs; no ischemic sxs; no changes PFSH.  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    LIPIDS at goal with LDL of 74...82 with no changes yet...142 b/c taking PRN due to leg pain, so change to another... and is tolerating lipitor, so will titrate and blame the R leg pain on his back and eval when he desires...82, but will try 80 mg now...68, but , so need to drop back down and add zetia when LFT's normalize...on 40 Lipitor with qod Zetia=LDL 73 and ALT only 40, so go to qd on Zetia, but no more than 40 of lipitor...69 is best gonna get; LFT's neg 7/18 OV...81 is ok for now...120 in 1/20 b/c sick in hosp past month; no changes needed...74 is goal... 59---> 80 in 4/21, so increase on RTO if same.  --  HTN remains well controlled=ditto 10/20.  --  HYPOTHYROIDISM...increase again 8/17...0.3 despite back on lower dose=112, so stay on it... stable on this dose 1/19...TSH 0.16, so rec try 100 in 7/19...was fine in 11/19, but 23 at 1/20 OV after a month in hosp; I d/w finish up the 112 he has on hand and then back to 100---> up 4/21, but no c/o, so defer RTO.  --  PORTAL VEIN THROMBOSIS occurred after the stenosis below, so it has been over 6 mo as of 10/20 OV and he has not had a restenosis after temp stents removed, so I agree with stopping Eliquis at this time---> no sxs this regard 4/21.  --  PSC CIRRHOSIS=dx'd 10/18 after elevated LFT's noted during eval of upper back pain with jaundice; s/p failed cannulation of CBD 12/21/18...admitted [facility] 12/19 with biliary stricture and had failed ERCP again; required percutaneous drain; concern for chloangiocarcinoma noted; was d/c on the 13 and readmitted on 12/26 and required stents; then 3rd admit for portal vein thrombosis; has f/u with Dr Pena...they need to put in permanent stents sometime, but pt wants to wait as of 4/20 due to virus...stents out 5/20 with stable labs...ditto 10/20 and they didn't call him back and that's ok since LFTs wnl---> AFP/lipase/LFT's neg  4/21.    ULCERATIVE COLITIS since '82 on below and with no recent flare...says no issues 7/19...c-scope 9/19 was neg---> no bowel issues 4/21.  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  --  VIT D DEF wnl at 37...later still.  R ULNAR ENTRAPMENT 2/18 = tender, so will try medrol; to ortho if no better... better after tx per chiropractor, so defer as of 7/18 OV.  --  PTOSIS...this is not MG, eval per optho for surgery...here for pre-op clearance...10/3...no CP/SOB, c/w meds, labs noted...s/p 10/13.  HOARSENESS d/w me at 7/18 OV; no concerning meds, no sprays OTC, no A.R., no GERD; will ask ENT to eval for polyps.  --  --  PSA 2.8 (7/2/18)...5.3 (1/10/20) and will address after Pancreatic ca/liver ca ruled out---> 3.9 (7/13/20)---> 3.1 (10/5/2021).  COLON 9/19...wnl... Dr Naranjo (Neg polyps, neg FH; ? q 3-5 yrs due to UC?).  Hep A x1; ? needs Hep B=I told him to d/w GI.  (, 2 kids=son here and girl in [facility]= still working with [employer] as of 10/21 OV).    Follow Up   Return in about 6 months (around 12/9/2022).  Patient was given instructions and counseling regarding his condition or for health maintenance advice. Please see specific information pulled into the AVS if appropriate.

## 2022-06-09 NOTE — ASSESSMENT & PLAN NOTE
TSH has corrected itself, it is down to 1.5 as of his 6/22 office visit.  Patient stable to stick with 100 mcg Synthroid daily.

## 2022-06-09 NOTE — ASSESSMENT & PLAN NOTE
LDL jumped from 77 to 206 off of high-dose Lipitor.  Dr. Refugio Mejia started him on Nexletol 180 mg, and his LDL is right back down to 76.  This is with Zetia on board as well.  Patient appears to be tolerating this, continue same plan of care.

## 2022-06-09 NOTE — ASSESSMENT & PLAN NOTE
He is maintained on sulfasalazine, very stable on this, so should continue same.  It's cynthia 2 years since colonoscopy, and he is apparently set up for repeat one soon.---> That was as of his 2/22 office visit.  Still pending as of 6/22 office visit, patient is aware he is past due.  No new symptoms, continues to sulfasalazine, refill given today.

## 2022-06-09 NOTE — ASSESSMENT & PLAN NOTE
Patient with no significant ischemic symptoms, no chest pain or pressure.  He does get dyspneic some with activities.  He is stable to continue with low-dose beta-blocker and baby aspirin for his antianginal regimen.

## 2022-06-09 NOTE — ASSESSMENT & PLAN NOTE
Blood pressure was normal on my repeat as of his 6/22 office visit.  Patient stable on low-dose metoprolol only, continue same going forward.

## 2022-06-22 RX ORDER — SULFASALAZINE 500 MG/1
TABLET ORAL
Qty: 360 TABLET | Refills: 1 | Status: SHIPPED | OUTPATIENT
Start: 2022-06-22 | End: 2022-12-07 | Stop reason: SDUPTHER

## 2022-07-25 RX ORDER — ATORVASTATIN CALCIUM 80 MG/1
TABLET, FILM COATED ORAL
Qty: 90 TABLET | Refills: 1 | OUTPATIENT
Start: 2022-07-25

## 2022-09-23 RX ORDER — LEVOTHYROXINE SODIUM 0.1 MG/1
TABLET ORAL
Qty: 90 TABLET | Refills: 1 | Status: SHIPPED | OUTPATIENT
Start: 2022-09-23 | End: 2023-03-24

## 2022-10-12 RX ORDER — FOLIC ACID 1 MG/1
TABLET ORAL
Qty: 90 TABLET | Refills: 1 | Status: SHIPPED | OUTPATIENT
Start: 2022-10-12

## 2022-10-12 RX ORDER — EZETIMIBE 10 MG/1
TABLET ORAL
Qty: 90 TABLET | Refills: 1 | Status: SHIPPED | OUTPATIENT
Start: 2022-10-12 | End: 2023-03-06 | Stop reason: SDUPTHER

## 2022-11-21 RX ORDER — ATORVASTATIN CALCIUM 80 MG/1
TABLET, FILM COATED ORAL
Qty: 90 TABLET | Refills: 1 | OUTPATIENT
Start: 2022-11-21

## 2022-11-30 ENCOUNTER — LAB (OUTPATIENT)
Dept: LAB | Facility: HOSPITAL | Age: 67
End: 2022-11-30

## 2022-11-30 DIAGNOSIS — D50.9 IRON DEFICIENCY ANEMIA, UNSPECIFIED IRON DEFICIENCY ANEMIA TYPE: ICD-10-CM

## 2022-11-30 DIAGNOSIS — E03.4 HYPOTHYROIDISM DUE TO ACQUIRED ATROPHY OF THYROID: ICD-10-CM

## 2022-11-30 DIAGNOSIS — K51.919 ULCERATIVE COLITIS WITH COMPLICATION, UNSPECIFIED LOCATION: ICD-10-CM

## 2022-11-30 DIAGNOSIS — E78.2 MIXED HYPERLIPIDEMIA: ICD-10-CM

## 2022-11-30 DIAGNOSIS — I10 PRIMARY HYPERTENSION: ICD-10-CM

## 2022-11-30 LAB
ALBUMIN SERPL-MCNC: 5.1 G/DL (ref 3.5–5.2)
ALBUMIN/GLOB SERPL: 1.8 G/DL
ALP SERPL-CCNC: 82 U/L (ref 39–117)
ALT SERPL W P-5'-P-CCNC: 17 U/L (ref 1–41)
ANION GAP SERPL CALCULATED.3IONS-SCNC: 11.6 MMOL/L (ref 5–15)
AST SERPL-CCNC: 26 U/L (ref 1–40)
BASOPHILS # BLD AUTO: 0.04 10*3/MM3 (ref 0–0.2)
BASOPHILS NFR BLD AUTO: 0.7 % (ref 0–1.5)
BILIRUB SERPL-MCNC: 0.7 MG/DL (ref 0–1.2)
BUN SERPL-MCNC: 11 MG/DL (ref 8–23)
BUN/CREAT SERPL: 10.1 (ref 7–25)
CALCIUM SPEC-SCNC: 10.1 MG/DL (ref 8.6–10.5)
CHLORIDE SERPL-SCNC: 100 MMOL/L (ref 98–107)
CHOLEST SERPL-MCNC: 197 MG/DL (ref 0–200)
CO2 SERPL-SCNC: 28.4 MMOL/L (ref 22–29)
CREAT SERPL-MCNC: 1.09 MG/DL (ref 0.76–1.27)
DEPRECATED RDW RBC AUTO: 39.9 FL (ref 37–54)
EGFRCR SERPLBLD CKD-EPI 2021: 74.9 ML/MIN/1.73
EOSINOPHIL # BLD AUTO: 0.03 10*3/MM3 (ref 0–0.4)
EOSINOPHIL NFR BLD AUTO: 0.6 % (ref 0.3–6.2)
ERYTHROCYTE [DISTWIDTH] IN BLOOD BY AUTOMATED COUNT: 11.7 % (ref 12.3–15.4)
FERRITIN SERPL-MCNC: 336 NG/ML (ref 30–400)
FOLATE SERPL-MCNC: >20 NG/ML (ref 4.78–24.2)
GLOBULIN UR ELPH-MCNC: 2.8 GM/DL
GLUCOSE SERPL-MCNC: 89 MG/DL (ref 65–99)
HCT VFR BLD AUTO: 41.8 % (ref 37.5–51)
HDLC SERPL-MCNC: 41 MG/DL (ref 40–60)
HGB BLD-MCNC: 13.9 G/DL (ref 13–17.7)
IMM GRANULOCYTES # BLD AUTO: 0.01 10*3/MM3 (ref 0–0.05)
IMM GRANULOCYTES NFR BLD AUTO: 0.2 % (ref 0–0.5)
IRON 24H UR-MRATE: 106 MCG/DL (ref 59–158)
IRON SATN MFR SERPL: 28 % (ref 20–50)
LDLC SERPL CALC-MCNC: 128 MG/DL (ref 0–100)
LDLC/HDLC SERPL: 3.04 {RATIO}
LYMPHOCYTES # BLD AUTO: 1.03 10*3/MM3 (ref 0.7–3.1)
LYMPHOCYTES NFR BLD AUTO: 18.9 % (ref 19.6–45.3)
MCH RBC QN AUTO: 31.4 PG (ref 26.6–33)
MCHC RBC AUTO-ENTMCNC: 33.3 G/DL (ref 31.5–35.7)
MCV RBC AUTO: 94.4 FL (ref 79–97)
MONOCYTES # BLD AUTO: 0.51 10*3/MM3 (ref 0.1–0.9)
MONOCYTES NFR BLD AUTO: 9.4 % (ref 5–12)
NEUTROPHILS NFR BLD AUTO: 3.83 10*3/MM3 (ref 1.7–7)
NEUTROPHILS NFR BLD AUTO: 70.2 % (ref 42.7–76)
NRBC BLD AUTO-RTO: 0 /100 WBC (ref 0–0.2)
PLATELET # BLD AUTO: 304 10*3/MM3 (ref 140–450)
PMV BLD AUTO: 9.7 FL (ref 6–12)
POTASSIUM SERPL-SCNC: 3.8 MMOL/L (ref 3.5–5.2)
PROT SERPL-MCNC: 7.9 G/DL (ref 6–8.5)
RBC # BLD AUTO: 4.43 10*6/MM3 (ref 4.14–5.8)
SODIUM SERPL-SCNC: 140 MMOL/L (ref 136–145)
T4 FREE SERPL-MCNC: 1.88 NG/DL (ref 0.93–1.7)
TIBC SERPL-MCNC: 378 MCG/DL (ref 298–536)
TRANSFERRIN SERPL-MCNC: 254 MG/DL (ref 200–360)
TRIGL SERPL-MCNC: 157 MG/DL (ref 0–150)
TSH SERPL DL<=0.05 MIU/L-ACNC: 1.72 UIU/ML (ref 0.27–4.2)
VIT B12 BLD-MCNC: 342 PG/ML (ref 211–946)
VLDLC SERPL-MCNC: 28 MG/DL (ref 5–40)
WBC NRBC COR # BLD: 5.45 10*3/MM3 (ref 3.4–10.8)

## 2022-11-30 PROCEDURE — 82728 ASSAY OF FERRITIN: CPT

## 2022-11-30 PROCEDURE — 83540 ASSAY OF IRON: CPT

## 2022-11-30 PROCEDURE — 80061 LIPID PANEL: CPT

## 2022-11-30 PROCEDURE — 82746 ASSAY OF FOLIC ACID SERUM: CPT

## 2022-11-30 PROCEDURE — 84443 ASSAY THYROID STIM HORMONE: CPT

## 2022-11-30 PROCEDURE — 82607 VITAMIN B-12: CPT

## 2022-11-30 PROCEDURE — 84466 ASSAY OF TRANSFERRIN: CPT

## 2022-11-30 PROCEDURE — 85025 COMPLETE CBC W/AUTO DIFF WBC: CPT

## 2022-11-30 PROCEDURE — 80053 COMPREHEN METABOLIC PANEL: CPT

## 2022-11-30 PROCEDURE — 84439 ASSAY OF FREE THYROXINE: CPT

## 2022-11-30 PROCEDURE — 36415 COLL VENOUS BLD VENIPUNCTURE: CPT

## 2022-12-07 ENCOUNTER — OFFICE VISIT (OUTPATIENT)
Dept: INTERNAL MEDICINE | Facility: CLINIC | Age: 67
End: 2022-12-07

## 2022-12-07 VITALS
TEMPERATURE: 97.9 F | HEART RATE: 54 BPM | HEIGHT: 71 IN | SYSTOLIC BLOOD PRESSURE: 155 MMHG | BODY MASS INDEX: 21.36 KG/M2 | DIASTOLIC BLOOD PRESSURE: 71 MMHG | WEIGHT: 152.6 LBS | OXYGEN SATURATION: 96 %

## 2022-12-07 DIAGNOSIS — M54.42 CHRONIC LEFT-SIDED LOW BACK PAIN WITH LEFT-SIDED SCIATICA: ICD-10-CM

## 2022-12-07 DIAGNOSIS — Z95.1 HX OF THREE VESSEL CORONARY ARTERY BYPASS: ICD-10-CM

## 2022-12-07 DIAGNOSIS — K51.919 ULCERATIVE COLITIS WITH COMPLICATION, UNSPECIFIED LOCATION: ICD-10-CM

## 2022-12-07 DIAGNOSIS — I10 PRIMARY HYPERTENSION: ICD-10-CM

## 2022-12-07 DIAGNOSIS — Z12.5 PROSTATE CANCER SCREENING: ICD-10-CM

## 2022-12-07 DIAGNOSIS — G89.29 CHRONIC LEFT-SIDED LOW BACK PAIN WITH LEFT-SIDED SCIATICA: ICD-10-CM

## 2022-12-07 DIAGNOSIS — E03.4 HYPOTHYROIDISM DUE TO ACQUIRED ATROPHY OF THYROID: Primary | ICD-10-CM

## 2022-12-07 DIAGNOSIS — E78.2 MIXED HYPERLIPIDEMIA: ICD-10-CM

## 2022-12-07 DIAGNOSIS — Z23 NEED FOR VACCINATION: ICD-10-CM

## 2022-12-07 DIAGNOSIS — Z12.11 COLON CANCER SCREENING: ICD-10-CM

## 2022-12-07 PROCEDURE — 90471 IMMUNIZATION ADMIN: CPT | Performed by: INTERNAL MEDICINE

## 2022-12-07 PROCEDURE — 90715 TDAP VACCINE 7 YRS/> IM: CPT | Performed by: INTERNAL MEDICINE

## 2022-12-07 PROCEDURE — 99214 OFFICE O/P EST MOD 30 MIN: CPT | Performed by: INTERNAL MEDICINE

## 2022-12-07 RX ORDER — GABAPENTIN 100 MG/1
CAPSULE ORAL
Qty: 60 CAPSULE | Refills: 2 | Status: SHIPPED | OUTPATIENT
Start: 2022-12-07 | End: 2023-03-06

## 2022-12-07 RX ORDER — SULFASALAZINE 500 MG/1
1000 TABLET ORAL 2 TIMES DAILY
Qty: 360 TABLET | Refills: 3 | Status: SHIPPED | OUTPATIENT
Start: 2022-12-07

## 2022-12-07 NOTE — ASSESSMENT & PLAN NOTE
This is new as of his 12/22 office visit.  He took off work for couple weeks to get a little better, but it is difficult to sit in a car for even 10 minutes at a time.  He is having some radiation into the lower extremity.  He is not typically had these problems, we need to please get a plain film, and I will put him on low-dose gabapentin.

## 2022-12-07 NOTE — ASSESSMENT & PLAN NOTE
Patient has been able to work the colonoscopy into his schedule this year, I went ahead and put another referral in for Dr. Naranjo.  He had some issues with constipation week or so ago, but no blood in the stool, certainly no significant abdominal pain fevers etc.

## 2022-12-07 NOTE — PROGRESS NOTES
"Chief Complaint  Hyperlipidemia and Follow-up (Patient here for routine follow up)    Subjective          Troy Vega presents to [facility]     History of Present Illness  Patient is a pleasant 67-year-old male with underlying coronary artery disease prior bypass, Crohn's disease, hypertension and hyperlipidemia, who is here 12/22 for routine 4-6-month follow-up.  We will review his labs and make further recommendations at that time.    Review of Systems   Constitutional: Negative for appetite change, fatigue and fever.   HENT: Negative for congestion and ear pain.    Eyes: Negative for blurred vision.   Respiratory: Negative for cough, chest tightness, shortness of breath and wheezing.    Cardiovascular: Negative for chest pain, palpitations and leg swelling.   Gastrointestinal: Negative for abdominal pain.   Genitourinary: Negative for difficulty urinating, dysuria and hematuria.   Musculoskeletal: Negative for arthralgias and gait problem.   Skin: Negative for skin lesions.   Neurological: Negative for syncope, memory problem and confusion.   Psychiatric/Behavioral: Negative for self-injury and depressed mood.       Objective   Vital Signs:   /71   Pulse 54   Temp 97.9 °F (36.6 °C)   Ht 180.3 cm (70.98\")   Wt 69.2 kg (152 lb 9.6 oz)   SpO2 96%   BMI 21.29 kg/m²           Physical Exam  Vitals and nursing note reviewed.   Constitutional:       General: He is not in acute distress.     Appearance: Normal appearance. He is not toxic-appearing.   HENT:      Head: Atraumatic.      Right Ear: External ear normal.      Left Ear: External ear normal.      Nose: Nose normal.      Mouth/Throat:      Mouth: Mucous membranes are moist.   Eyes:      General:         Right eye: No discharge.         Left eye: No discharge.      Extraocular Movements: Extraocular movements intact.      Pupils: Pupils are equal, round, and reactive to light.   Cardiovascular:      Rate and " Rhythm: Normal rate and regular rhythm.      Pulses: Normal pulses.      Heart sounds: Normal heart sounds. No murmur heard.    No gallop.   Pulmonary:      Effort: Pulmonary effort is normal. No respiratory distress.      Breath sounds: No wheezing, rhonchi or rales.   Abdominal:      General: There is no distension.      Palpations: Abdomen is soft. There is no mass.      Tenderness: There is no abdominal tenderness. There is no guarding.   Musculoskeletal:         General: No swelling or tenderness.      Cervical back: No tenderness.      Right lower leg: No edema.      Left lower leg: No edema.   Skin:     General: Skin is warm and dry.      Findings: No rash.   Neurological:      General: No focal deficit present.      Mental Status: He is alert and oriented to person, place, and time. Mental status is at baseline.      Motor: No weakness.      Gait: Gait normal.   Psychiatric:         Mood and Affect: Mood normal.         Thought Content: Thought content normal.          Result Review :   The following data was reviewed by: Rolando Lopez MD on 10/15/2021:                  Assessment and Plan    Diagnoses and all orders for this visit:    1. Hypothyroidism due to acquired atrophy of thyroid (Primary)  Assessment & Plan:  TSH is stable at 1.7 as of his 12/22 office visit.  Patient is fine to stick with 100 mcg Synthroid daily.    Orders:  -     TSH+Free T4; Future    2. Hx of three vessel coronary artery bypass  Overview:  CAD/CABG 3V (7/11) and sees Dr Brownlee q 6-12 mo.        Assessment & Plan:  Patient remains very active and without any ischemic symptoms as of his 12/22 office visit.  He stable to continue with baby aspirin and low-dose metoprolol for antianginal benefit.      3. Mixed hyperlipidemia  Assessment & Plan:  Patient's LDL is up from around 70 to about 130 as of his 12/22 office visit.  He had recently been started on Nexletol, and Dr. Refugio Mejia wanted to find out if patient still required  Zetia.  He had a near 54 point jump off of this, so he resumed it and is also still consistent with Nexletol obviously.  Patient stable to continue same.    Orders:  -     Lipid Panel; Future    4. Primary hypertension  Assessment & Plan:  Blood pressure with reasonable control as of his 12/22 office visit.  It probably been better on my repeat, he is on low-dose metoprolol for antianginal benefit, we will continue with this going forward.    Orders:  -     Comprehensive Metabolic Panel; Future    5. Ulcerative colitis with complication, unspecified location (HCC)  Overview:  Patient is followed by Dr. Naranjo for this.        Assessment & Plan:  Patient has been able to work the colonoscopy into his schedule this year, I went ahead and put another referral in for Dr. Naranjo.  He had some issues with constipation week or so ago, but no blood in the stool, certainly no significant abdominal pain fevers etc.    Orders:  -     Ambulatory Referral For Screening Colonoscopy    6. Colon cancer screening  -     Ambulatory Referral For Screening Colonoscopy    7. Prostate cancer screening  -     PSA Screen; Future    8. Chronic left-sided low back pain with left-sided sciatica  Assessment & Plan:  This is new as of his 12/22 office visit.  He took off work for couple weeks to get a little better, but it is difficult to sit in a car for even 10 minutes at a time.  He is having some radiation into the lower extremity.  He is not typically had these problems, we need to please get a plain film, and I will put him on low-dose gabapentin.    Orders:  -     XR Spine Lumbar 2 or 3 View; Future  -     gabapentin (NEURONTIN) 100 MG capsule; Take 1 capsule every evening, may increase every several days until utilizing 6 capsules if needed.  Dispense: 60 capsule; Refill: 2    9. Need for vaccination    Other orders  -     Tdap Vaccine Greater Than or Equal To 8yo IM  -     sulfaSALAzine (AZULFIDINE) 500 MG tablet; Take 2 tablets by  mouth 2 (Two) Times a Day.  Dispense: 360 tablet; Refill: 3     --  --  OLDER NOTES:  ANNUAL PHYSICAL 4/21; d/w all labs; no ischemic sxs; no changes PFSH.  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    LIPIDS at goal with LDL of 74...82 with no changes yet...142 b/c taking PRN due to leg pain, so change to another... and is tolerating lipitor, so will titrate and blame the R leg pain on his back and eval when he desires...82, but will try 80 mg now...68, but , so need to drop back down and add zetia when LFT's normalize...on 40 Lipitor with qod Zetia=LDL 73 and ALT only 40, so go to qd on Zetia, but no more than 40 of lipitor...69 is best gonna get; LFT's neg 7/18 OV...81 is ok for now...120 in 1/20 b/c sick in hosp past month; no changes needed...74 is goal... 59---> 80 in 4/21, so increase on RTO if same.  --  HTN remains well controlled=ditto 10/20.  --  HYPOTHYROIDISM...increase again 8/17...0.3 despite back on lower dose=112, so stay on it... stable on this dose 1/19...TSH 0.16, so rec try 100 in 7/19...was fine in 11/19, but 23 at 1/20 OV after a month in hosp; I d/w finish up the 112 he has on hand and then back to 100---> up 4/21, but no c/o, so defer RTO.  --  PORTAL VEIN THROMBOSIS occurred after the stenosis below, so it has been over 6 mo as of 10/20 OV and he has not had a restenosis after temp stents removed, so I agree with stopping Eliquis at this time---> no sxs this regard 4/21.  --  PSC CIRRHOSIS=dx'd 10/18 after elevated LFT's noted during eval of upper back pain with jaundice; s/p failed cannulation of CBD 12/21/18...admitted [facility] 12/19 with biliary stricture and had failed ERCP again; required percutaneous drain; concern for chloangiocarcinoma noted; was d/c on  the 13 and readmitted on 12/26 and required stents; then 3rd admit for portal vein thrombosis; has f/u with Dr Pena...they need to put in permanent stents sometime, but pt wants to wait as of 4/20 due to virus...stents out 5/20 with stable labs...ditto 10/20 and they didn't call him back and that's ok since LFTs wnl---> AFP/lipase/LFT's neg 4/21.    ULCERATIVE COLITIS since '82 on below and with no recent flare...says no issues 7/19...c-scope 9/19 was neg---> no bowel issues 4/21.  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  --  VIT D DEF wnl at 37...later still.  R ULNAR ENTRAPMENT 2/18 = tender, so will try medrol; to ortho if no better... better after tx per chiropractor, so defer as of 7/18 OV.  --  PTOSIS...this is not MG, eval per optho for surgery...here for pre-op clearance...10/3...no CP/SOB, c/w meds, labs noted...s/p 10/13.  HOARSENESS d/w me at 7/18 OV; no concerning meds, no sprays OTC, no A.R., no GERD; will ask ENT to eval for polyps.  --  --  PSA 2.8 (7/2/18)...5.3 (1/10/20) and will address after Pancreatic ca/liver ca ruled out---> 3.9 (7/13/20)---> 3.1 (10/5/2021).  COLON 9/19...wnl... Dr Naranjo (Neg polyps, neg FH; ? q 3-5 yrs due to UC?).  Hep A x1; ? needs Hep B=I told him to d/w GI.  (, 2 kids=son here and girl in [facility]= still working with [employer] as of 12/22 OV).    Follow Up   Return in about 6 months (around 6/7/2023).  Patient was given instructions and counseling regarding his condition or for health maintenance advice. Please see specific information pulled into the AVS if appropriate.

## 2022-12-07 NOTE — ASSESSMENT & PLAN NOTE
TSH is stable at 1.7 as of his 12/22 office visit.  Patient is fine to stick with 100 mcg Synthroid daily.

## 2022-12-07 NOTE — ASSESSMENT & PLAN NOTE
Blood pressure with reasonable control as of his 12/22 office visit.  It probably been better on my repeat, he is on low-dose metoprolol for antianginal benefit, we will continue with this going forward.

## 2022-12-07 NOTE — PATIENT INSTRUCTIONS
The new COVID-vaccine/booster is referred to as the bivalent vaccine, it covers more than 1 variant of the coronavirus.  Current recommendations are just for 1 dose, when you get this, you are considered up-to-date.

## 2022-12-07 NOTE — ASSESSMENT & PLAN NOTE
Patient remains very active and without any ischemic symptoms as of his 12/22 office visit.  He stable to continue with baby aspirin and low-dose metoprolol for antianginal benefit.

## 2022-12-07 NOTE — ASSESSMENT & PLAN NOTE
Patient's LDL is up from around 70 to about 130 as of his 12/22 office visit.  He had recently been started on Nexletol, and Dr. Refugio Mejia wanted to find out if patient still required Zetia.  He had a near 54 point jump off of this, so he resumed it and is also still consistent with Nexletol obviously.  Patient stable to continue same.

## 2022-12-14 ENCOUNTER — HOSPITAL ENCOUNTER (OUTPATIENT)
Dept: GENERAL RADIOLOGY | Facility: HOSPITAL | Age: 67
Discharge: HOME | End: 2022-12-14
Admitting: INTERNAL MEDICINE

## 2022-12-14 DIAGNOSIS — M54.42 CHRONIC LEFT-SIDED LOW BACK PAIN WITH LEFT-SIDED SCIATICA: ICD-10-CM

## 2022-12-14 DIAGNOSIS — G89.29 CHRONIC LEFT-SIDED LOW BACK PAIN WITH LEFT-SIDED SCIATICA: ICD-10-CM

## 2022-12-14 PROCEDURE — 72100 X-RAY EXAM L-S SPINE 2/3 VWS: CPT

## 2023-03-05 DIAGNOSIS — G89.29 CHRONIC LEFT-SIDED LOW BACK PAIN WITH LEFT-SIDED SCIATICA: ICD-10-CM

## 2023-03-05 DIAGNOSIS — M54.42 CHRONIC LEFT-SIDED LOW BACK PAIN WITH LEFT-SIDED SCIATICA: ICD-10-CM

## 2023-03-06 RX ORDER — GABAPENTIN 100 MG/1
CAPSULE ORAL
Qty: 60 CAPSULE | Refills: 2 | Status: SHIPPED | OUTPATIENT
Start: 2023-03-06

## 2023-03-07 RX ORDER — EZETIMIBE 10 MG/1
10 TABLET ORAL DAILY
Qty: 90 TABLET | Refills: 1 | Status: SHIPPED | OUTPATIENT
Start: 2023-03-07

## 2023-03-24 RX ORDER — LEVOTHYROXINE SODIUM 0.1 MG/1
100 TABLET ORAL DAILY
Qty: 90 TABLET | Refills: 1 | Status: SHIPPED | OUTPATIENT
Start: 2023-03-24

## 2023-03-24 RX ORDER — LEVOTHYROXINE SODIUM 0.1 MG/1
TABLET ORAL
Qty: 90 TABLET | Refills: 1 | Status: SHIPPED | OUTPATIENT
Start: 2023-03-24

## 2023-04-21 RX ORDER — FOLIC ACID 1 MG/1
TABLET ORAL
Qty: 90 TABLET | Refills: 1 | Status: SHIPPED | OUTPATIENT
Start: 2023-04-21

## 2023-05-15 NOTE — PROGRESS NOTES
Chief Complaint  Ulcerative colitis and colon cancer screening     Troy Vega is a 67 y.o. male who presents to [facility] on referral from Rolando Lopez MD for a gastroenterology evaluation of ulcerative colitis and colon cancer screening       History of Present Illness  New patient presents to the office for ulcerative colitis and colon cancer screening. He was first diagnosed with UC in 1980s, denies exposure to biologic therapy. He has adequately controlled of UC with sulfasalazine 2 tablets BID. He has about 1 normal bowel movement a day.  Denies abdominal pain, melena, and hematochezia.  Denies unintentional weight loss.  Denies upper GI symptoms.  Patient was diagnosed with primary sclerosing cholangitis by Dr. Upton at [facility] 2019.  Denies right upper quadrant pain, extremity swelling, abdominal swelling, excessive bruising/bleeding, problems with memory, and difficulty sleeping.    Colonoscopy 09/16/2019 by Dr. Naranjo - normal mucosa throughout colon. Repeat colonoscopy in 2 years.       Past Medical History:   Diagnosis Date   • Abnormal finding on radiology exam    • Atherosclerosis of coronary artery bypass graft(s) without angina pectoris    • Atrophy of thyroid (acquired)    • Decreased white blood cell count    • Elevated CK    • Hypercholesterolemia    • Hypothyroidism    • Leukopenia    • Mixed hyperlipidemia    • Other decreased white blood cell count    • Other fatigue    • Primary sclerosing cholangitis    • Ulcerative (chronic) pancolitis without complications    • Ulcerative colitis, unspecified, without complications        Past Surgical History:   Procedure Laterality Date   • APPENDECTOMY  1980   • CARDIAC CATHETERIZATION      12/2019 Cath placement (2)   • CORONARY ARTERY BYPASS GRAFT      CABG 7/2011 3V   • PANCREAS SURGERY      12/2018 Stent in pancreas         Current Outpatient Medications:   •  aspirin 81 MG EC tablet, Take 81 mg by  mouth Daily., Disp: , Rfl:   •  ezetimibe (ZETIA) 10 MG tablet, Take 1 tablet by mouth Daily., Disp: 90 tablet, Rfl: 1  •  folic acid (FOLVITE) 1 MG tablet, TAKE ONE TABLET BY MOUTH DAILY, Disp: 90 tablet, Rfl: 1  •  gabapentin (NEURONTIN) 100 MG capsule, TAKE ONE CAPSULE BY MOUTH EVERY EVENING MAY INCREASE EVERY SEVERAL DAYS UNTIL UTILIZING 6 CAPSULES IF NEEDED, Disp: 60 capsule, Rfl: 2  •  levothyroxine (SYNTHROID, LEVOTHROID) 100 MCG tablet, TAKE ONE TABLET BY MOUTH DAILY, Disp: 90 tablet, Rfl: 1  •  levothyroxine (SYNTHROID, LEVOTHROID) 100 MCG tablet, Take 1 tablet by mouth Daily., Disp: 90 tablet, Rfl: 1  •  metoprolol succinate XL (TOPROL-XL) 25 MG 24 hr tablet, Take 25 mg by mouth Daily., Disp: , Rfl:   •  Multiple Vitamin (multivitamin) capsule, Take 1 capsule by mouth Daily., Disp: , Rfl:   •  Nexletol 180 MG tablet, Take 1 tablet by mouth Daily., Disp: , Rfl:   •  sulfaSALAzine (AZULFIDINE) 500 MG tablet, Take 2 tablets by mouth 2 (Two) Times a Day., Disp: 360 tablet, Rfl: 3     Allergies   Allergen Reactions   • Levofloxacin Other (See Comments)     Destroyed his muscles   • Oxycodone GI Intolerance   • Oxycodone Hcl GI Intolerance       No family history on file.     Social History     Social History Narrative   • Not on file       Immunization:  Immunization History   Administered Date(s) Administered   • COVID-19 (MODERNA) 1st,2nd,3rd Dose Monovalent 03/30/2021, 04/27/2021   • Fluad Quad 65+ 10/20/2022   • Flublok 18+yrs 10/17/2018   • Fluzone High-Dose 65+yrs 10/15/2021   • Pneumococcal Conjugate 20-Valent (PCV20) 06/09/2022   • Tdap 12/07/2022        Objective     Vital Signs:   There were no vitals taken for this visit.      Physical Exam  Constitutional:       Appearance: Normal appearance.   HENT:      Head: Normocephalic.   Cardiovascular:      Rate and Rhythm: Normal rate and regular rhythm.      Heart sounds: Normal heart sounds.   Pulmonary:      Effort: Pulmonary effort is normal.       Breath sounds: Normal breath sounds.   Abdominal:      General: Bowel sounds are normal.      Palpations: Abdomen is soft.   Skin:     General: Skin is warm and dry.   Neurological:      Mental Status: He is alert and oriented to person, place, and time. Mental status is at baseline.   Psychiatric:         Mood and Affect: Mood normal.         Behavior: Behavior normal.         Thought Content: Thought content normal.         Judgment: Judgment normal.         Result Review :     CBC w/diff        6/6/2022    08:23 11/30/2022    08:41   CBC w/Diff   WBC 3.52   5.45     RBC 4.24   4.43     Hemoglobin 13.1   13.9     Hematocrit 39.7   41.8     MCV 93.6   94.4     MCH 30.9   31.4     MCHC 33.0   33.3     RDW 11.7   11.7     Platelets 277   304     Neutrophil Rel % 53.3   70.2     Immature Granulocyte Rel % 0.3   0.2     Lymphocyte Rel % 34.1   18.9     Monocyte Rel % 11.4   9.4     Eosinophil Rel % 0.0   0.6     Basophil Rel % 0.9   0.7       CMP        6/6/2022    08:23 11/30/2022    08:41   CMP   Glucose 83   89     BUN 14   11     Creatinine 1.07   1.09     EGFR 77.0   74.9     Sodium 139   140     Potassium 4.2   3.8     Chloride 103   100     Calcium 9.2   10.1     Total Protein 7.1   7.9     Albumin 4.40   5.10     Globulin 2.7   2.8     Total Bilirubin 0.6   0.7     Alkaline Phosphatase 75   82     AST (SGOT) 25   26     ALT (SGPT) 19   17     Albumin/Globulin Ratio 1.6   1.8     BUN/Creatinine Ratio 13.1   10.1     Anion Gap 11.1   11.6               Assessment and Plan  There are no diagnoses linked to this encounter.    1. Ulcerative pancolitis without complication.   2. Primary Biliary cholangitis    67 year old patient presents to the office for ulcerative colitis and PSC. He was first diagnosed with UC in 1980s, denies exposure to biologic therapy. He has adequately controlled of UC with sulfasalazine 2 tablets BID. Patient was diagnosed with primary sclerosing cholangitis by Dr. Upton at [place] [id number],  requesting records. Most recent CMP shows normal liver enzymes. I have ordered further evaluation with ultrasound. Patient is due for screening colonoscopy, we will schedule at his convenience. Patient is agreeable to plan and will call the office with any questions or concerns.     COLONOSCOPY Surgical Risk and Benefits: Possible risk/complications, benefits, and alternatives to surgical or invasive procedure have been explained to patient and/or legal guardian. Risks include bleeding, infection, and perforation. Patient has been evaluated and can tolerate anesthesia and/or sedation. Risk, benefits, and alternatives to anesthesia and sedation have been explained to patient and/or legal guardian.       Follow Up   No follow-ups on file.  Patient was given instructions and counseling regarding his condition or for health maintenance advice. Please see specific information pulled into the AVS if appropriate.

## 2023-05-16 ENCOUNTER — PREP FOR SURGERY (OUTPATIENT)
Dept: OTHER | Facility: HOSPITAL | Age: 68
End: 2023-05-16
Payer: MEDICARE

## 2023-05-16 ENCOUNTER — OFFICE VISIT (OUTPATIENT)
Dept: GASTROENTEROLOGY | Facility: CLINIC | Age: 68
End: 2023-05-16
Payer: MEDICARE

## 2023-05-16 VITALS
BODY MASS INDEX: 21.8 KG/M2 | HEIGHT: 71 IN | DIASTOLIC BLOOD PRESSURE: 61 MMHG | WEIGHT: 155.7 LBS | SYSTOLIC BLOOD PRESSURE: 138 MMHG | OXYGEN SATURATION: 100 % | HEART RATE: 62 BPM

## 2023-05-16 DIAGNOSIS — K51.00 ULCERATIVE PANCOLITIS WITHOUT COMPLICATION: Primary | ICD-10-CM

## 2023-05-16 DIAGNOSIS — K74.3 PRIMARY BILIARY CHOLANGITIS: ICD-10-CM

## 2023-05-16 PROCEDURE — 3078F DIAST BP <80 MM HG: CPT

## 2023-05-16 PROCEDURE — 99214 OFFICE O/P EST MOD 30 MIN: CPT

## 2023-05-16 PROCEDURE — 1160F RVW MEDS BY RX/DR IN RCRD: CPT

## 2023-05-16 PROCEDURE — 3075F SYST BP GE 130 - 139MM HG: CPT

## 2023-05-16 PROCEDURE — 1159F MED LIST DOCD IN RCRD: CPT

## 2023-05-16 RX ORDER — NITROGLYCERIN 0.4 MG/1
TABLET SUBLINGUAL
COMMUNITY
Start: 2023-03-23

## 2023-05-16 RX ORDER — SOD SULF/POT CHLORIDE/MAG SULF 1.479 G
12 TABLET ORAL TAKE AS DIRECTED
Qty: 24 TABLET | Refills: 0 | Status: SHIPPED | OUTPATIENT
Start: 2023-05-16

## 2023-05-18 ENCOUNTER — PATIENT ROUNDING (BHMG ONLY) (OUTPATIENT)
Dept: GASTROENTEROLOGY | Facility: CLINIC | Age: 68
End: 2023-05-18
Payer: MEDICARE

## 2023-05-18 NOTE — PROGRESS NOTES
5/18/2023      Hello, may I speak with Troy Vega     My name is Tico. I am calling from [facility]. I show that you had a recent visit with ZAIRE Gaitan.    Before we get started may I verify your date of birth? 1955    I am calling to officially welcome you to our practice and ask about your recent visit. Is this a good time to talk? No. I left patient a voicemail, OK per KIM.     Tell me about your visit with us. What things went well?         We're always looking for ways to make our patients' experiences even better. Do you have recommendations on ways we may improve?    Overall were you satisfied with your first visit to our practice?    I appreciate you taking the time to speak with me today. Is there anything else I can do for you?    I am glad to hear that you had a very good visit and I appreciate you taking the time to provide feedback on this call. We would greatly appreciate you filling out a survey if you receive one in the mail, email or text. This is a great opportunity to provide any additional feedback that you may think of after this call as well.       Thank you, and have a great day.

## 2023-06-02 ENCOUNTER — LAB (OUTPATIENT)
Dept: LAB | Facility: HOSPITAL | Age: 68
End: 2023-06-02
Payer: MEDICARE

## 2023-06-02 DIAGNOSIS — E03.4 HYPOTHYROIDISM DUE TO ACQUIRED ATROPHY OF THYROID: ICD-10-CM

## 2023-06-02 DIAGNOSIS — I10 PRIMARY HYPERTENSION: ICD-10-CM

## 2023-06-02 DIAGNOSIS — Z12.5 PROSTATE CANCER SCREENING: ICD-10-CM

## 2023-06-02 DIAGNOSIS — E78.2 MIXED HYPERLIPIDEMIA: ICD-10-CM

## 2023-06-02 LAB
ALBUMIN SERPL-MCNC: 4.9 G/DL (ref 3.5–5.2)
ALBUMIN/GLOB SERPL: 2.1 G/DL
ALP SERPL-CCNC: 73 U/L (ref 39–117)
ALT SERPL W P-5'-P-CCNC: 19 U/L (ref 1–41)
ANION GAP SERPL CALCULATED.3IONS-SCNC: 10 MMOL/L (ref 5–15)
AST SERPL-CCNC: 31 U/L (ref 1–40)
BILIRUB SERPL-MCNC: 0.6 MG/DL (ref 0–1.2)
BUN SERPL-MCNC: 14 MG/DL (ref 8–23)
BUN/CREAT SERPL: 13.6 (ref 7–25)
CALCIUM SPEC-SCNC: 10 MG/DL (ref 8.6–10.5)
CHLORIDE SERPL-SCNC: 105 MMOL/L (ref 98–107)
CHOLEST SERPL-MCNC: 173 MG/DL (ref 0–200)
CO2 SERPL-SCNC: 27 MMOL/L (ref 22–29)
CREAT SERPL-MCNC: 1.03 MG/DL (ref 0.76–1.27)
EGFRCR SERPLBLD CKD-EPI 2021: 80.1 ML/MIN/1.73
GLOBULIN UR ELPH-MCNC: 2.3 GM/DL
GLUCOSE SERPL-MCNC: 89 MG/DL (ref 65–99)
HDLC SERPL-MCNC: 40 MG/DL (ref 40–60)
LDLC SERPL CALC-MCNC: 109 MG/DL (ref 0–100)
LDLC/HDLC SERPL: 2.65 {RATIO}
POTASSIUM SERPL-SCNC: 4.3 MMOL/L (ref 3.5–5.2)
PROT SERPL-MCNC: 7.2 G/DL (ref 6–8.5)
PSA SERPL-MCNC: 2.96 NG/ML (ref 0–4)
SODIUM SERPL-SCNC: 142 MMOL/L (ref 136–145)
T4 FREE SERPL-MCNC: 1.67 NG/DL (ref 0.93–1.7)
TRIGL SERPL-MCNC: 135 MG/DL (ref 0–150)
TSH SERPL DL<=0.05 MIU/L-ACNC: 0.18 UIU/ML (ref 0.27–4.2)
VLDLC SERPL-MCNC: 24 MG/DL (ref 5–40)

## 2023-06-02 PROCEDURE — 80053 COMPREHEN METABOLIC PANEL: CPT

## 2023-06-02 PROCEDURE — 84439 ASSAY OF FREE THYROXINE: CPT

## 2023-06-02 PROCEDURE — 36415 COLL VENOUS BLD VENIPUNCTURE: CPT

## 2023-06-02 PROCEDURE — 80061 LIPID PANEL: CPT

## 2023-06-02 PROCEDURE — G0103 PSA SCREENING: HCPCS

## 2023-06-02 PROCEDURE — 84443 ASSAY THYROID STIM HORMONE: CPT

## 2023-06-07 ENCOUNTER — OFFICE VISIT (OUTPATIENT)
Dept: INTERNAL MEDICINE | Facility: CLINIC | Age: 68
End: 2023-06-07
Payer: MEDICARE

## 2023-06-07 VITALS
HEART RATE: 82 BPM | TEMPERATURE: 98.2 F | DIASTOLIC BLOOD PRESSURE: 82 MMHG | HEIGHT: 71 IN | WEIGHT: 153 LBS | BODY MASS INDEX: 21.42 KG/M2 | SYSTOLIC BLOOD PRESSURE: 130 MMHG | OXYGEN SATURATION: 94 %

## 2023-06-07 DIAGNOSIS — E03.4 HYPOTHYROIDISM DUE TO ACQUIRED ATROPHY OF THYROID: ICD-10-CM

## 2023-06-07 DIAGNOSIS — I10 PRIMARY HYPERTENSION: ICD-10-CM

## 2023-06-07 DIAGNOSIS — Z95.1 HX OF THREE VESSEL CORONARY ARTERY BYPASS: ICD-10-CM

## 2023-06-07 DIAGNOSIS — R49.0 HOARSENESS OF VOICE: ICD-10-CM

## 2023-06-07 DIAGNOSIS — Z00.00 MEDICARE ANNUAL WELLNESS VISIT, SUBSEQUENT: Primary | ICD-10-CM

## 2023-06-07 DIAGNOSIS — E78.2 MIXED HYPERLIPIDEMIA: ICD-10-CM

## 2023-06-07 RX ORDER — LEVOTHYROXINE SODIUM 88 UG/1
88 TABLET ORAL DAILY
Qty: 90 TABLET | Refills: 1 | Status: SHIPPED | OUTPATIENT
Start: 2023-06-07

## 2023-06-07 NOTE — ASSESSMENT & PLAN NOTE
TSH is curiously down from 1.7 to 0.2 as of his 6/23 office visit.  He has been on the same dose for some time now, we actually had lowered him from 112 not too long ago.  He has had some palpitations, he does have underlying CAD, so I think we need to back it off further going forward.

## 2023-06-07 NOTE — PROGRESS NOTES
"Chief Complaint  Hypothyroidism (Routine follow up, Lab follow up. Pt states concern of loss of voice in the past year. Bilateral leg pain, cramping/ chronic pain. Pt states not taking anything for the pain. )    Subjective          Troy Vega presents to [facility]     History of Present Illness  Patient is a pleasant 67-year-old male with underlying coronary artery disease prior bypass, Crohn's disease, hypertension and hyperlipidemia, who is here 6/23 for routine 4-6-month follow-up.  We will review his labs and make further recommendations at that time.    Review of Systems   Constitutional:  Negative for appetite change, fatigue and fever.   HENT:  Negative for congestion and ear pain.    Eyes:  Negative for blurred vision.   Respiratory:  Negative for cough, chest tightness, shortness of breath and wheezing.    Cardiovascular:  Negative for chest pain, palpitations and leg swelling.   Gastrointestinal:  Negative for abdominal pain.   Genitourinary:  Negative for difficulty urinating, dysuria and hematuria.   Musculoskeletal:  Negative for arthralgias and gait problem.   Skin:  Negative for skin lesions.   Neurological:  Negative for syncope, memory problem and confusion.   Psychiatric/Behavioral:  Negative for self-injury and depressed mood.      Objective   Vital Signs:   /82   Pulse 82   Temp 98.2 °F (36.8 °C)   Ht 180.3 cm (70.98\")   Wt 69.4 kg (153 lb)   SpO2 94%   BMI 21.35 kg/m²           Physical Exam  Vitals and nursing note reviewed.   Constitutional:       General: He is not in acute distress.     Appearance: Normal appearance. He is not toxic-appearing.   HENT:      Head: Atraumatic.      Right Ear: External ear normal.      Left Ear: External ear normal.      Nose: Nose normal.      Mouth/Throat:      Mouth: Mucous membranes are moist.   Eyes:      General:         Right eye: No discharge.         Left eye: No discharge.      Extraocular " Movements: Extraocular movements intact.      Pupils: Pupils are equal, round, and reactive to light.   Cardiovascular:      Rate and Rhythm: Normal rate and regular rhythm.      Pulses: Normal pulses.      Heart sounds: Normal heart sounds. No murmur heard.    No gallop.   Pulmonary:      Effort: Pulmonary effort is normal. No respiratory distress.      Breath sounds: No wheezing, rhonchi or rales.   Abdominal:      General: There is no distension.      Palpations: Abdomen is soft. There is no mass.      Tenderness: There is no abdominal tenderness. There is no guarding.   Musculoskeletal:         General: No swelling or tenderness.      Cervical back: No tenderness.      Right lower leg: No edema.      Left lower leg: No edema.   Skin:     General: Skin is warm and dry.      Findings: No rash.   Neurological:      General: No focal deficit present.      Mental Status: He is alert and oriented to person, place, and time. Mental status is at baseline.      Motor: No weakness.      Gait: Gait normal.   Psychiatric:         Mood and Affect: Mood normal.         Thought Content: Thought content normal.        Result Review :   The following data was reviewed by: Rolando Lopez MD on 10/15/2021:                  Assessment and Plan    Diagnoses and all orders for this visit:    1. Medicare annual wellness visit, subsequent (Primary)  Assessment & Plan:  AWV completed 6/23.  Patient remains active and independent.  Patient with no falls and no hospitalizations in the past year.  He has a living will, asked him to bring us a copy at his next appointment so we can get it in the system.    Orders:  -     CBC & Differential; Future    2. Primary hypertension  Assessment & Plan:  Blood pressure remained stable as of his 6/23 office visit.  He is on just low-dose metoprolol for his underlying CAD, certainly stable to continue same.    Orders:  -     Comprehensive Metabolic Panel; Future    3. Hypothyroidism due to acquired  atrophy of thyroid  Assessment & Plan:  TSH is curiously down from 1.7 to 0.2 as of his 6/23 office visit.  He has been on the same dose for some time now, we actually had lowered him from 112 not too long ago.  He has had some palpitations, he does have underlying CAD, so I think we need to back it off further going forward.    Orders:  -     TSH; Future  -     TSH+Free T4; Future    4. Mixed hyperlipidemia  Assessment & Plan:  LDL is down from 130 off of Zetia to 110 back on it.  He is also on Nexletol 180 mg once daily.  When he was first started on Nexletol along with Zetia, his LDL did get down to 76.  Weight is certainly not trending up, so patient will continue full dose Nexletol and Zetia, discussed with Dr. Refugio Mejia on return to office as well.    Orders:  -     Lipid Panel; Future    5. Hx of three vessel coronary artery bypass  Overview:  CAD/CABG 3V (7/11) and sees Dr Brownlee q 6-12 mo.        Assessment & Plan:  Patient remains very active and without any ischemic symptoms as of his 6/23 office visit. He stable to continue with baby aspirin and low-dose metoprolol for antianginal benefit.  Obviously no recent Nitrostat use.  Additionally he follows with Dr. Refugio Mejia as noted above once or twice a year.      6. Hoarseness of voice  Assessment & Plan:  This is a new issue as of his 6/23 office visit.  Patient says its intermittent, he is a non-smoker, and no significant issues in regards to reflux.  Given the persistence of this, patient desires evaluation from ENT.    Orders:  -     Ambulatory Referral to ENT (Otolaryngology)    Other orders  -     levothyroxine (SYNTHROID, LEVOTHROID) 88 MCG tablet; Take 1 tablet by mouth Daily.  Dispense: 90 tablet; Refill: 1       --  --  OLDER NOTES:  ANNUAL PHYSICAL 4/21; d/w all labs; no ischemic sxs; no changes PFSH.  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    LIPIDS at goal with LDL of 74...82 with no changes yet...142 b/c taking PRN due to leg pain, so change to another... and is tolerating lipitor, so will titrate and blame the R leg pain on his back and eval when he desires...82, but will try 80 mg now...68, but , so need to drop back down and add zetia when LFT's normalize...on 40 Lipitor with qod Zetia=LDL 73 and ALT only 40, so go to qd on Zetia, but no more than 40 of lipitor...69 is best gonna get; LFT's neg 7/18 OV...81 is ok for now...120 in 1/20 b/c sick in hosp past month; no changes needed...74 is goal... 59---> 80 in 4/21, so increase on RTO if same.  --  HTN remains well controlled=ditto 10/20.  --  HYPOTHYROIDISM...increase again 8/17...0.3 despite back on lower dose=112, so stay on it... stable on this dose 1/19...TSH 0.16, so rec try 100 in 7/19...was fine in 11/19, but 23 at 1/20 OV after a month in hosp; I d/w finish up the 112 he has on hand and then back to 100---> up 4/21, but no c/o, so defer RTO.  --  PORTAL VEIN THROMBOSIS occurred after the stenosis below, so it has been over 6 mo as of 10/20 OV and he has not had a restenosis after temp stents removed, so I agree with stopping Eliquis at this time---> no sxs this regard 4/21.  --  PSC CIRRHOSIS=dx'd 10/18 after elevated LFT's noted during eval of upper back pain with jaundice; s/p failed cannulation of CBD 12/21/18...admitted [facility] 12/19 with biliary stricture and had failed ERCP again; required percutaneous drain; concern for chloangiocarcinoma noted; was d/c on the 13 and readmitted on 12/26 and required stents; then 3rd admit for portal vein thrombosis; has f/u with Dr Pena...they need to put in permanent stents sometime, but pt wants to wait as of 4/20 due to virus...stents out 5/20 with stable labs...ditto 10/20 and they didn't call him back and that's ok  since LFTs wnl---> AFP/lipase/LFT's neg 4/21.    ULCERATIVE COLITIS since '82 on below and with no recent flare...says no issues 7/19...c-scope 9/19 was neg---> no bowel issues 4/21.  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  --  VIT D DEF wnl at 37...later still.  R ULNAR ENTRAPMENT 2/18 = tender, so will try medrol; to ortho if no better... better after tx per chiropractor, so defer as of 7/18 OV.  --  PTOSIS...this is not MG, eval per optho for surgery...here for pre-op clearance...10/3...no CP/SOB, c/w meds, labs noted...s/p 10/13.  HOARSENESS d/w me at 7/18 OV; no concerning meds, no sprays OTC, no A.R., no GERD; will ask ENT to eval for polyps.  --  --  PSA 2.9 (6/2/2023)  COLON 9/19...wnl... Dr Naranjo (Neg polyps, neg FH; ? q 3-5 yrs due to UC?).  Hep A x1; ? needs Hep B=I told him to d/w GI.  (, 2 kids=son here and girl in [facility]= still working with [employer] as of 12/22 OV).    Follow Up   Return in about 6 months (around 12/7/2023).  Patient was given instructions and counseling regarding his condition or for health maintenance advice. Please see specific information pulled into the AVS if appropriate.

## 2023-06-07 NOTE — ASSESSMENT & PLAN NOTE
This is a new issue as of his 6/23 office visit.  Patient says its intermittent, he is a non-smoker, and no significant issues in regards to reflux.  Given the persistence of this, patient desires evaluation from ENT.

## 2023-06-07 NOTE — ASSESSMENT & PLAN NOTE
Blood pressure remained stable as of his 6/23 office visit.  He is on just low-dose metoprolol for his underlying CAD, certainly stable to continue same.

## 2023-06-07 NOTE — ASSESSMENT & PLAN NOTE
Patient remains very active and without any ischemic symptoms as of his 6/23 office visit. He stable to continue with baby aspirin and low-dose metoprolol for antianginal benefit.  Obviously no recent Nitrostat use.  Additionally he follows with Dr. Refugio Mejia as noted above once or twice a year.

## 2023-06-07 NOTE — PATIENT INSTRUCTIONS
As discussed, we need to back off some on your thyroid, so put the 100 mcg tablets to the side.    2.  I sent a prescription over for 88 mcg, take this daily.    3.  You have orders pended for a nonfasting thyroid blood test in 3 months, please call the next day for the results.    4.  You have routine fasting labs ordered for 6 months, just do these a few days before your appointment.    5.  You should be getting a call from the ENTs office in regards to evaluation of the intermittent hoarseness.

## 2023-06-07 NOTE — ASSESSMENT & PLAN NOTE
MIKALAV completed 6/23.  Patient remains active and independent.  Patient with no falls and no hospitalizations in the past year.  He has a living will, asked him to bring us a copy at his next appointment so we can get it in the system.

## 2023-06-07 NOTE — PROGRESS NOTES
The ABCs of the Annual Wellness Visit  Subsequent Medicare Wellness Visit    Subjective      Troy Vega is a 67 y.o. male who presents for a Subsequent Medicare Wellness Visit.    The following portions of the patient's history were reviewed and   updated as appropriate: allergies, current medications, past family history, past medical history, past social history, past surgical history, and problem list.    Compared to one year ago, the patient feels his physical   health is the same.    Compared to one year ago, the patient feels his mental   health is the same.    Recent Hospitalizations:  He was not admitted to the hospital during the last year.       Current Medical Providers:  Patient Care Team:  Rolando Lopez MD as PCP - General (Internal Medicine)  Humera Brownlee MD as Consulting Physician (Cardiology)    Outpatient Medications Prior to Visit   Medication Sig Dispense Refill    aspirin 81 MG EC tablet Take 1 tablet by mouth Daily.      ezetimibe (ZETIA) 10 MG tablet Take 1 tablet by mouth Daily. 90 tablet 1    folic acid (FOLVITE) 1 MG tablet TAKE ONE TABLET BY MOUTH DAILY 90 tablet 1    levothyroxine (SYNTHROID, LEVOTHROID) 100 MCG tablet Take 1 tablet by mouth Daily. 90 tablet 1    metoprolol succinate XL (TOPROL-XL) 25 MG 24 hr tablet Take 1 tablet by mouth Daily.      Multiple Vitamin (multivitamin) capsule Take 1 capsule by mouth Daily.      Nexletol 180 MG tablet Take 1 tablet by mouth Daily.      nitroglycerin (NITROSTAT) 0.4 MG SL tablet       Sodium Sulfate-Mag Sulfate-KCl (Sutab) 1498-453-174 MG tablet Take 12 tablets by mouth Take As Directed. Take 12 tablets at 6 pm and 12 tablets 4 hours prior to procedure. 24 tablet 0    sulfaSALAzine (AZULFIDINE) 500 MG tablet Take 2 tablets by mouth 2 (Two) Times a Day. 360 tablet 3    gabapentin (NEURONTIN) 100 MG capsule TAKE ONE CAPSULE BY MOUTH EVERY EVENING MAY INCREASE EVERY SEVERAL DAYS UNTIL UTILIZING 6 CAPSULES IF NEEDED (Patient not  "taking: Reported on 6/7/2023) 60 capsule 2    levothyroxine (SYNTHROID, LEVOTHROID) 100 MCG tablet TAKE ONE TABLET BY MOUTH DAILY 90 tablet 1     No facility-administered medications prior to visit.       No opioid medication identified on active medication list. I have reviewed chart for other potential  high risk medication/s and harmful drug interactions in the elderly.        Aspirin is on active medication list. Aspirin use is indicated based on review of current medical condition/s. Pros and cons of this therapy have been discussed today. Benefits of this medication outweigh potential harm.  Patient has been encouraged to continue taking this medication.  .      Patient Active Problem List   Diagnosis    Hx of three vessel coronary artery bypass    PSC (primary sclerosing cholangitis)    Primary hypertension    Hypothyroidism due to acquired atrophy of thyroid    Mixed hyperlipidemia    Ulcerative colitis    Chronic left-sided low back pain with left-sided sciatica    Medicare annual wellness visit, subsequent    Hoarseness of voice     Advance Care Planning   Advance Care Planning     Advance Directive is not on file.  ACP discussion was held with the patient during this visit. Patient has an advance directive (not in EMR), copy requested.     Objective    Vitals:    06/07/23 1236   BP: 130/82   Pulse: 82   Temp: 98.2 °F (36.8 °C)   SpO2: 94%   Weight: 69.4 kg (153 lb)   Height: 180.3 cm (70.98\")     Estimated body mass index is 21.35 kg/m² as calculated from the following:    Height as of this encounter: 180.3 cm (70.98\").    Weight as of this encounter: 69.4 kg (153 lb).    BMI is within normal parameters. No other follow-up for BMI required.      Does the patient have evidence of cognitive impairment?   No    Lab Results   Component Value Date    TRIG 135 06/02/2023    HDL 40 06/02/2023     (H) 06/02/2023    VLDL 24 06/02/2023          HEALTH RISK ASSESSMENT    Smoking Status:  Social History "     Tobacco Use   Smoking Status Never    Passive exposure: Never   Smokeless Tobacco Never     Alcohol Consumption:  Social History     Substance and Sexual Activity   Alcohol Use Never     Fall Risk Screen:    STEADI Fall Risk Assessment was completed, and patient is at LOW risk for falls.Assessment completed on:2023    Depression Screenin/7/2023    12:42 PM   PHQ-2/PHQ-9 Depression Screening   Little Interest or Pleasure in Doing Things 0-->not at all   Feeling Down, Depressed or Hopeless 0-->not at all   PHQ-9: Brief Depression Severity Measure Score 0       Health Habits and Functional and Cognitive Screenin/7/2023    12:00 PM   Functional & Cognitive Status   Do you have difficulty preparing food and eating? No   Do you have difficulty bathing yourself, getting dressed or grooming yourself? No   Do you have difficulty using the toilet? No   Do you have difficulty moving around from place to place? No   Do you have trouble with steps or getting out of a bed or a chair? No   Do you need help using the phone?  No   Are you deaf or do you have serious difficulty hearing?  Yes   Do you need help with transportation? No   Do you need help shopping? No   Do you need help preparing meals?  No   Do you need help with housework?  No   Do you need help with laundry? No   Do you need help taking your medications? No   Do you need help managing money? No   Do you ever drive or ride in a car without wearing a seat belt? No   Do you have difficulty concentrating, remembering or making decisions? No       Age-appropriate Screening Schedule:  Refer to the list below for future screening recommendations based on patient's age, sex and/or medical conditions. Orders for these recommended tests are listed in the plan section. The patient has been provided with a written plan.    Health Maintenance   Topic Date Due    ZOSTER VACCINE (1 of 2) Never done    Hepatitis B (1 of 3 - Risk 3-dose series) Never done     ANNUAL WELLNESS VISIT  Never done    COLORECTAL CANCER SCREENING  07/27/2023 (Originally 1955)    COVID-19 Vaccine (3 - Moderna series) 09/04/2023 (Originally 6/22/2021)    INFLUENZA VACCINE  08/01/2023    LIPID PANEL  06/02/2024    TDAP/TD VACCINES (2 - Td or Tdap) 12/07/2032    HEPATITIS C SCREENING  Completed    Pneumococcal Vaccine 65+  Completed                  CMS Preventative Services Quick Reference  Risk Factors Identified During Encounter:    None Identified    The above risks/problems have been discussed with the patient.  Pertinent information has been shared with the patient in the After Visit Summary.    Diagnoses and all orders for this visit:    1. Medicare annual wellness visit, subsequent (Primary)  Assessment & Plan:  AWV completed 6/23.  Patient remains active and independent.  Patient with no falls and no hospitalizations in the past year.  He has a living will, asked him to bring us a copy at his next appointment so we can get it in the system.    Orders:  -     CBC & Differential; Future    2. Primary hypertension  Assessment & Plan:  Blood pressure remained stable as of his 6/23 office visit.  He is on just low-dose metoprolol for his underlying CAD, certainly stable to continue same.    Orders:  -     Comprehensive Metabolic Panel; Future    3. Hypothyroidism due to acquired atrophy of thyroid  Assessment & Plan:  TSH is curiously down from 1.7 to 0.2 as of his 6/23 office visit.  He has been on the same dose for some time now, we actually had lowered him from 112 not too long ago.  He has had some palpitations, he does have underlying CAD, so I think we need to back it off further going forward.    Orders:  -     TSH; Future  -     TSH+Free T4; Future    4. Mixed hyperlipidemia  Assessment & Plan:  LDL is down from 130 off of Zetia to 110 back on it.  He is also on Nexletol 180 mg once daily.  When he was first started on Nexletol along with Zetia, his LDL did get down to 76.  Weight  is certainly not trending up, so patient will continue full dose Nexletol and Zetia, discussed with Dr. Refugio Mejia on return to office as well.    Orders:  -     Lipid Panel; Future    5. Hx of three vessel coronary artery bypass  Assessment & Plan:  Patient remains very active and without any ischemic symptoms as of his 6/23 office visit. He stable to continue with baby aspirin and low-dose metoprolol for antianginal benefit.  Obviously no recent Nitrostat use.  Additionally he follows with Dr. Refugio Mejia as noted above once or twice a year.      6. Hoarseness of voice  Assessment & Plan:  This is a new issue as of his 6/23 office visit.  Patient says its intermittent, he is a non-smoker, and no significant issues in regards to reflux.  Given the persistence of this, patient desires evaluation from ENT.    Orders:  -     Ambulatory Referral to ENT (Otolaryngology)        Follow Up:   Next Medicare Wellness visit to be scheduled in 1 year.      An After Visit Summary and PPPS were made available to the patient.

## 2023-06-07 NOTE — ASSESSMENT & PLAN NOTE
LDL is down from 130 off of Zetia to 110 back on it.  He is also on Nexletol 180 mg once daily.  When he was first started on Nexletol along with Zetia, his LDL did get down to 76.  Weight is certainly not trending up, so patient will continue full dose Nexletol and Zetia, discussed with Dr. Refugio Mejia on return to office as well.

## 2023-07-24 NOTE — PRE-PROCEDURE INSTRUCTIONS
"Instructed on date and arrival time of  1100.Come to entrance \"C\". Must have  over age 18 to drive home.  May have two visitors; however, children under 12 must stay in waiting room.  Discussed clear liquid diet (no red or purple) and bowel prep.  May take medications as usual except for blood thinners, diabetic medications, and weight loss medications.  Bring list of medications.  Verbalized understanding of instructions given.  Phone number given for questions or concerns.  Cardiac clearance noted in chart.  "

## 2023-07-26 ENCOUNTER — ANESTHESIA (OUTPATIENT)
Dept: GASTROENTEROLOGY | Facility: HOSPITAL | Age: 68
End: 2023-07-26
Payer: MEDICARE

## 2023-07-26 ENCOUNTER — HOSPITAL ENCOUNTER (OUTPATIENT)
Facility: HOSPITAL | Age: 68
Setting detail: HOSPITAL OUTPATIENT SURGERY
Discharge: HOME | End: 2023-07-26
Attending: INTERNAL MEDICINE | Admitting: INTERNAL MEDICINE
Payer: MEDICARE

## 2023-07-26 ENCOUNTER — ANESTHESIA EVENT (OUTPATIENT)
Dept: GASTROENTEROLOGY | Facility: HOSPITAL | Age: 68
End: 2023-07-26
Payer: MEDICARE

## 2023-07-26 VITALS
TEMPERATURE: 96.8 F | SYSTOLIC BLOOD PRESSURE: 125 MMHG | RESPIRATION RATE: 24 BRPM | DIASTOLIC BLOOD PRESSURE: 78 MMHG | OXYGEN SATURATION: 98 % | BODY MASS INDEX: 20.67 KG/M2 | WEIGHT: 148.15 LBS | HEART RATE: 60 BPM

## 2023-07-26 DIAGNOSIS — K51.00 ULCERATIVE PANCOLITIS WITHOUT COMPLICATION: ICD-10-CM

## 2023-07-26 PROCEDURE — 25010000002 PROPOFOL 10 MG/ML EMULSION

## 2023-07-26 PROCEDURE — 45380 COLONOSCOPY AND BIOPSY: CPT | Performed by: INTERNAL MEDICINE

## 2023-07-26 PROCEDURE — 88305 TISSUE EXAM BY PATHOLOGIST: CPT | Performed by: INTERNAL MEDICINE

## 2023-07-26 RX ORDER — LIDOCAINE HYDROCHLORIDE 20 MG/ML
INJECTION, SOLUTION EPIDURAL; INFILTRATION; INTRACAUDAL; PERINEURAL AS NEEDED
Status: DISCONTINUED | OUTPATIENT
Start: 2023-07-26 | End: 2023-07-26 | Stop reason: SURG

## 2023-07-26 RX ORDER — PROPOFOL 10 MG/ML
VIAL (ML) INTRAVENOUS AS NEEDED
Status: DISCONTINUED | OUTPATIENT
Start: 2023-07-26 | End: 2023-07-26 | Stop reason: SURG

## 2023-07-26 RX ORDER — SODIUM CHLORIDE, SODIUM LACTATE, POTASSIUM CHLORIDE, CALCIUM CHLORIDE 600; 310; 30; 20 MG/100ML; MG/100ML; MG/100ML; MG/100ML
30 INJECTION, SOLUTION INTRAVENOUS CONTINUOUS
Status: DISCONTINUED | OUTPATIENT
Start: 2023-07-26 | End: 2023-07-26 | Stop reason: HOSPADM

## 2023-07-26 RX ADMIN — PROPOFOL 100 MG: 10 INJECTION, EMULSION INTRAVENOUS at 13:02

## 2023-07-26 RX ADMIN — LIDOCAINE HYDROCHLORIDE 40 MG: 20 INJECTION, SOLUTION EPIDURAL; INFILTRATION; INTRACAUDAL; PERINEURAL at 13:02

## 2023-07-26 RX ADMIN — SODIUM CHLORIDE, POTASSIUM CHLORIDE, SODIUM LACTATE AND CALCIUM CHLORIDE 30 ML/HR: 600; 310; 30; 20 INJECTION, SOLUTION INTRAVENOUS at 11:59

## 2023-07-26 RX ADMIN — PROPOFOL 200 MCG/KG/MIN: 10 INJECTION, EMULSION INTRAVENOUS at 13:03

## 2023-07-26 NOTE — ANESTHESIA PREPROCEDURE EVALUATION
Anesthesia Evaluation     Patient summary reviewed and Nursing notes reviewed                Airway   Mallampati: I  TM distance: >3 FB  Neck ROM: full  No difficulty expected  Dental      Pulmonary - negative pulmonary ROS and normal exam    breath sounds clear to auscultation  Cardiovascular - normal exam    Rhythm: regular  Rate: normal    (+) hypertension, CAD, CABGhyperlipidemia      Neuro/Psych  (+) numbness  GI/Hepatic/Renal/Endo    (+) liver disease, thyroid problem hypothyroidism    Musculoskeletal (-) negative ROS    Abdominal    Substance History - negative use     OB/GYN negative ob/gyn ROS         Other        ROS/Med Hx Other: Hx of three vessel coronary artery bypass                   Anesthesia Plan    ASA 4     general     intravenous induction     Anesthetic plan, risks, benefits, and alternatives have been provided, discussed and informed consent has been obtained with: patient.    CODE STATUS:

## 2023-07-26 NOTE — ANESTHESIA POSTPROCEDURE EVALUATION
Patient: Troy Vega    Procedure Summary       Date: 07/26/23 Room / Location: [facility] ENDOSCOPY 2 / [facility] ENDOSCOPY    Anesthesia Start: 1300 Anesthesia Stop: 1322    Procedure: COLONOSCOPY WITH BIOPSIES Diagnosis:       Ulcerative pancolitis without complication      (Ulcerative pancolitis without complication [K51.00])    Surgeons: Gabino Naranjo MD Provider: Karen Monroe MD    Anesthesia Type: general ASA Status: 4            Anesthesia Type: general    Vitals  Vitals Value Taken Time   /78 07/26/23 1335   Temp 36 °C (96.8 °F) 07/26/23 1321   Pulse 60 07/26/23 1335   Resp 24 07/26/23 1335   SpO2 98 % 07/26/23 1335           Post Anesthesia Care and Evaluation    Patient location during evaluation: bedside  Patient participation: complete - patient participated  Level of consciousness: awake  Pain management: adequate    Airway patency: patent  PONV Status: none  Cardiovascular status: acceptable and stable  Respiratory status: acceptable  Hydration status: acceptable    Comments: An Anesthesiologist personally participated in the most demanding procedures (including induction and emergence if applicable) in the anesthesia plan, monitored the course of anesthesia administration at frequent intervals and remained physically present and available for immediate diagnosis and treatment of emergencies.

## 2023-07-26 NOTE — H&P
Pre Procedure History & Physical    Chief Complaint:   Ulcerative colitis  PSC    Subjective     HPI:   As above    Past Medical History:   Past Medical History:   Diagnosis Date    Abnormal finding on radiology exam     Atherosclerosis of coronary artery bypass graft(s) without angina pectoris     Atrophy of thyroid (acquired)     Decreased white blood cell count     Elevated CK     Hypercholesterolemia     Hypothyroidism     Leukopenia     Mixed hyperlipidemia     Other decreased white blood cell count     Other fatigue     Primary sclerosing cholangitis     Ulcerative (chronic) pancolitis without complications     Ulcerative colitis, unspecified, without complications        Past Surgical History:  Past Surgical History:   Procedure Laterality Date    APPENDECTOMY  1980    CARDIAC CATHETERIZATION      12/2019 Cath placement (2)    COLONOSCOPY      CORONARY ARTERY BYPASS GRAFT      CABG 7/2011 3V    PANCREAS SURGERY      12/2018 Stent in pancreas    UPPER GASTROINTESTINAL ENDOSCOPY         Family History:  Family History   Problem Relation Age of Onset    Colon cancer Neg Hx        Social History:   reports that he has never smoked. He has never been exposed to tobacco smoke. He has never used smokeless tobacco. He reports that he does not drink alcohol and does not use drugs.    Medications:   Medications Prior to Admission   Medication Sig Dispense Refill Last Dose    aspirin 81 MG EC tablet Take 1 tablet by mouth Daily.   Past Week    ezetimibe (ZETIA) 10 MG tablet Take 1 tablet by mouth Daily. 90 tablet 1 Past Week    folic acid (FOLVITE) 1 MG tablet TAKE ONE TABLET BY MOUTH DAILY 90 tablet 1 Past Week    levothyroxine (SYNTHROID, LEVOTHROID) 88 MCG tablet Take 1 tablet by mouth Daily. 90 tablet 1 Past Week    metoprolol succinate XL (TOPROL-XL) 25 MG 24 hr tablet Take 1 tablet by mouth Daily.   Past Week    Multiple Vitamin (multivitamin) capsule Take 1 capsule by mouth Daily.   Past Week    Nexletol 180 MG  tablet Take 1 tablet by mouth Daily.   Past Week    sulfaSALAzine (AZULFIDINE) 500 MG tablet Take 2 tablets by mouth 2 (Two) Times a Day. 360 tablet 3 Past Week    nitroglycerin (NITROSTAT) 0.4 MG SL tablet           Allergies:  Levofloxacin, Oxycodone, and Oxycodone hcl        Objective     Blood pressure 144/84, pulse 64, temperature 97.4 °F (36.3 °C), temperature source Temporal, resp. rate 18, weight 67.2 kg (148 lb 2.4 oz), SpO2 96 %.    Physical Exam   Constitutional: Pt is oriented to person, place, and time and well-developed, well-nourished, and in no distress.   Mouth/Throat: Oropharynx is clear and moist.   Neck: Normal range of motion.   Cardiovascular: Normal rate, regular rhythm and normal heart sounds.    Pulmonary/Chest: Effort normal and breath sounds normal.   Abdominal: Soft. Nontender  Skin: Skin is warm and dry.   Psychiatric: Mood, memory, affect and judgment normal.     Assessment & Plan     Diagnosis:  Ulcerative colitis  Primary sclerosing cholangitis    Anticipated Surgical Procedure:  colonoscopy    The risks, benefits, and alternatives of this procedure have been discussed with the patient or the responsible party- the patient understands and agrees to proceed.

## 2023-07-27 LAB
CYTO UR: NORMAL
LAB AP CASE REPORT: NORMAL
LAB AP CLINICAL INFORMATION: NORMAL
PATH REPORT.FINAL DX SPEC: NORMAL
PATH REPORT.GROSS SPEC: NORMAL

## 2023-07-28 ENCOUNTER — TELEPHONE (OUTPATIENT)
Dept: GASTROENTEROLOGY | Facility: CLINIC | Age: 68
End: 2023-07-28
Payer: MEDICARE

## 2023-07-28 NOTE — TELEPHONE ENCOUNTER
----- Message from ZAIRE Schneider sent at 7/27/2023  3:38 PM EDT -----  I have reviewed the patient's most recent colonoscopy and pathology report.  Biopsies show no activity in the ascending, transverse, sigmoid, and rectum.  Continue present medication.  Patient will need repeat colonoscopy in 1 year.  Please place in recall.

## 2023-07-28 NOTE — TELEPHONE ENCOUNTER
1yr colon recall and care gap placed.  Spoke with pt. Notified of results. Voiced understanding. mary

## 2023-10-23 RX ORDER — FOLIC ACID 1 MG/1
TABLET ORAL
Qty: 90 TABLET | Refills: 1 | Status: SHIPPED | OUTPATIENT
Start: 2023-10-23

## 2023-11-30 ENCOUNTER — LAB (OUTPATIENT)
Dept: LAB | Facility: HOSPITAL | Age: 68
End: 2023-11-30
Payer: MEDICARE

## 2023-11-30 DIAGNOSIS — Z00.00 MEDICARE ANNUAL WELLNESS VISIT, SUBSEQUENT: ICD-10-CM

## 2023-11-30 DIAGNOSIS — E03.4 HYPOTHYROIDISM DUE TO ACQUIRED ATROPHY OF THYROID: ICD-10-CM

## 2023-11-30 DIAGNOSIS — E78.2 MIXED HYPERLIPIDEMIA: ICD-10-CM

## 2023-11-30 DIAGNOSIS — I10 PRIMARY HYPERTENSION: ICD-10-CM

## 2023-11-30 LAB
ALBUMIN SERPL-MCNC: 5 G/DL (ref 3.5–5.2)
ALBUMIN/GLOB SERPL: 2.4 G/DL
ALP SERPL-CCNC: 76 U/L (ref 39–117)
ALT SERPL W P-5'-P-CCNC: 28 U/L (ref 1–41)
ANION GAP SERPL CALCULATED.3IONS-SCNC: 8 MMOL/L (ref 5–15)
AST SERPL-CCNC: 32 U/L (ref 1–40)
BASOPHILS # BLD AUTO: 0.02 10*3/MM3 (ref 0–0.2)
BASOPHILS NFR BLD AUTO: 0.7 % (ref 0–1.5)
BILIRUB SERPL-MCNC: 0.5 MG/DL (ref 0–1.2)
BUN SERPL-MCNC: 13 MG/DL (ref 8–23)
BUN/CREAT SERPL: 11.7 (ref 7–25)
CALCIUM SPEC-SCNC: 9.8 MG/DL (ref 8.6–10.5)
CHLORIDE SERPL-SCNC: 104 MMOL/L (ref 98–107)
CHOLEST SERPL-MCNC: 162 MG/DL (ref 0–200)
CO2 SERPL-SCNC: 29 MMOL/L (ref 22–29)
CREAT SERPL-MCNC: 1.11 MG/DL (ref 0.76–1.27)
DEPRECATED RDW RBC AUTO: 39.7 FL (ref 37–54)
EGFRCR SERPLBLD CKD-EPI 2021: 72.8 ML/MIN/1.73
EOSINOPHIL # BLD AUTO: 0 10*3/MM3 (ref 0–0.4)
EOSINOPHIL NFR BLD AUTO: 0 % (ref 0.3–6.2)
ERYTHROCYTE [DISTWIDTH] IN BLOOD BY AUTOMATED COUNT: 11.6 % (ref 12.3–15.4)
GLOBULIN UR ELPH-MCNC: 2.1 GM/DL
GLUCOSE SERPL-MCNC: 100 MG/DL (ref 65–99)
HCT VFR BLD AUTO: 39.1 % (ref 37.5–51)
HDLC SERPL-MCNC: 42 MG/DL (ref 40–60)
HGB BLD-MCNC: 13.2 G/DL (ref 13–17.7)
IMM GRANULOCYTES # BLD AUTO: 0.01 10*3/MM3 (ref 0–0.05)
IMM GRANULOCYTES NFR BLD AUTO: 0.3 % (ref 0–0.5)
LDLC SERPL CALC-MCNC: 101 MG/DL (ref 0–100)
LDLC/HDLC SERPL: 2.36 {RATIO}
LYMPHOCYTES # BLD AUTO: 1.05 10*3/MM3 (ref 0.7–3.1)
LYMPHOCYTES NFR BLD AUTO: 35.1 % (ref 19.6–45.3)
MCH RBC QN AUTO: 32 PG (ref 26.6–33)
MCHC RBC AUTO-ENTMCNC: 33.8 G/DL (ref 31.5–35.7)
MCV RBC AUTO: 94.9 FL (ref 79–97)
MONOCYTES # BLD AUTO: 0.43 10*3/MM3 (ref 0.1–0.9)
MONOCYTES NFR BLD AUTO: 14.4 % (ref 5–12)
NEUTROPHILS NFR BLD AUTO: 1.48 10*3/MM3 (ref 1.7–7)
NEUTROPHILS NFR BLD AUTO: 49.5 % (ref 42.7–76)
NRBC BLD AUTO-RTO: 0 /100 WBC (ref 0–0.2)
PLATELET # BLD AUTO: 289 10*3/MM3 (ref 140–450)
PMV BLD AUTO: 10 FL (ref 6–12)
POTASSIUM SERPL-SCNC: 4.7 MMOL/L (ref 3.5–5.2)
PROT SERPL-MCNC: 7.1 G/DL (ref 6–8.5)
RBC # BLD AUTO: 4.12 10*6/MM3 (ref 4.14–5.8)
SODIUM SERPL-SCNC: 141 MMOL/L (ref 136–145)
T4 FREE SERPL-MCNC: 1.79 NG/DL (ref 0.93–1.7)
TRIGL SERPL-MCNC: 104 MG/DL (ref 0–150)
TSH SERPL DL<=0.05 MIU/L-ACNC: 0.84 UIU/ML (ref 0.27–4.2)
VLDLC SERPL-MCNC: 19 MG/DL (ref 5–40)
WBC NRBC COR # BLD AUTO: 2.99 10*3/MM3 (ref 3.4–10.8)

## 2023-11-30 PROCEDURE — 84443 ASSAY THYROID STIM HORMONE: CPT

## 2023-11-30 PROCEDURE — 36415 COLL VENOUS BLD VENIPUNCTURE: CPT

## 2023-11-30 PROCEDURE — 80061 LIPID PANEL: CPT

## 2023-11-30 PROCEDURE — 85025 COMPLETE CBC W/AUTO DIFF WBC: CPT

## 2023-11-30 PROCEDURE — 84439 ASSAY OF FREE THYROXINE: CPT

## 2023-11-30 PROCEDURE — 80053 COMPREHEN METABOLIC PANEL: CPT

## 2023-12-01 RX ORDER — LEVOTHYROXINE SODIUM 88 UG/1
88 TABLET ORAL DAILY
Qty: 90 TABLET | Refills: 1 | Status: SHIPPED | OUTPATIENT
Start: 2023-12-01

## 2023-12-01 RX ORDER — SULFASALAZINE 500 MG/1
1000 TABLET ORAL 2 TIMES DAILY
Qty: 360 TABLET | Refills: 3 | Status: SHIPPED | OUTPATIENT
Start: 2023-12-01

## 2023-12-07 ENCOUNTER — OFFICE VISIT (OUTPATIENT)
Dept: INTERNAL MEDICINE | Facility: CLINIC | Age: 68
End: 2023-12-07
Payer: MEDICARE

## 2023-12-07 VITALS
RESPIRATION RATE: 16 BRPM | HEIGHT: 71 IN | SYSTOLIC BLOOD PRESSURE: 132 MMHG | TEMPERATURE: 97.5 F | BODY MASS INDEX: 22.12 KG/M2 | DIASTOLIC BLOOD PRESSURE: 62 MMHG | OXYGEN SATURATION: 99 % | HEART RATE: 54 BPM | WEIGHT: 158 LBS

## 2023-12-07 DIAGNOSIS — Z12.5 PROSTATE CANCER SCREENING: ICD-10-CM

## 2023-12-07 DIAGNOSIS — Z00.00 WELL ADULT EXAM: ICD-10-CM

## 2023-12-07 DIAGNOSIS — Z95.1 HX OF THREE VESSEL CORONARY ARTERY BYPASS: Primary | ICD-10-CM

## 2023-12-07 DIAGNOSIS — D70.2 DRUG-INDUCED LEUKOPENIA: ICD-10-CM

## 2023-12-07 DIAGNOSIS — I10 PRIMARY HYPERTENSION: ICD-10-CM

## 2023-12-07 DIAGNOSIS — E03.4 HYPOTHYROIDISM DUE TO ACQUIRED ATROPHY OF THYROID: ICD-10-CM

## 2023-12-07 DIAGNOSIS — E78.2 MIXED HYPERLIPIDEMIA: ICD-10-CM

## 2023-12-07 NOTE — ASSESSMENT & PLAN NOTE
This is related to chronic sulfasalazine use.  White count fluctuates around 3.0, and that is where he sits as of 12/23.  His differential is fine, hemoglobin/platelet well normal as well.  Will keep an eye on this with his routine labs.

## 2023-12-07 NOTE — ASSESSMENT & PLAN NOTE
Patient still active with carpentry/etc., no ischemic symptoms, stable to continue aspirin and low-dose metoprolol.  He is followed closely by Dr. Refugio Mejia, no concerns from his perspective either.

## 2023-12-07 NOTE — PROGRESS NOTES
"Chief Complaint  Follow-up (6 month follow up/ labs done)    Subjective          Troy Vega presents to [facility]     History of Present Illness  Patient is a pleasant 68-year-old male with underlying coronary artery disease prior bypass, Crohn's disease, hypertension and hyperlipidemia, who is here 12/23 for routine 6-month follow-up.  We will review his labs and make further recommendations at that time.    Review of Systems   Constitutional:  Negative for appetite change, fatigue and fever.   HENT:  Negative for congestion and ear pain.    Eyes:  Negative for blurred vision.   Respiratory:  Negative for cough, chest tightness, shortness of breath and wheezing.    Cardiovascular:  Negative for chest pain, palpitations and leg swelling.   Gastrointestinal:  Negative for abdominal pain.   Genitourinary:  Negative for difficulty urinating, dysuria and hematuria.   Musculoskeletal:  Negative for arthralgias and gait problem.   Skin:  Negative for skin lesions.   Neurological:  Negative for syncope, memory problem and confusion.   Psychiatric/Behavioral:  Negative for self-injury and depressed mood.        Objective   Vital Signs:   /62   Pulse 54   Temp 97.5 °F (36.4 °C) (Temporal)   Resp 16   Ht 180.3 cm (70.98\")   Wt 71.7 kg (158 lb)   SpO2 99%   BMI 22.05 kg/m²           Physical Exam  Vitals and nursing note reviewed.   Constitutional:       General: He is not in acute distress.     Appearance: Normal appearance. He is not toxic-appearing.   HENT:      Head: Atraumatic.      Right Ear: External ear normal.      Left Ear: External ear normal.      Nose: Nose normal.      Mouth/Throat:      Mouth: Mucous membranes are moist.   Eyes:      General:         Right eye: No discharge.         Left eye: No discharge.      Extraocular Movements: Extraocular movements intact.      Pupils: Pupils are equal, round, and reactive to light.   Cardiovascular:      Rate and " Rhythm: Normal rate and regular rhythm.      Pulses: Normal pulses.      Heart sounds: Normal heart sounds. No murmur heard.     No gallop.   Pulmonary:      Effort: Pulmonary effort is normal. No respiratory distress.      Breath sounds: No wheezing, rhonchi or rales.   Abdominal:      General: There is no distension.      Palpations: Abdomen is soft. There is no mass.      Tenderness: There is no abdominal tenderness. There is no guarding.   Musculoskeletal:         General: No swelling or tenderness.      Cervical back: No tenderness.      Right lower leg: No edema.      Left lower leg: No edema.   Skin:     General: Skin is warm and dry.      Findings: No rash.   Neurological:      General: No focal deficit present.      Mental Status: He is alert and oriented to person, place, and time. Mental status is at baseline.      Motor: No weakness.      Gait: Gait normal.   Psychiatric:         Mood and Affect: Mood normal.         Thought Content: Thought content normal.          Result Review :   The following data was reviewed by: Rolando Lopze MD on 10/15/2021:                  Assessment and Plan    Diagnoses and all orders for this visit:    1. Hx of three vessel coronary artery bypass (Primary)  Overview:  CAD/CABG 3V (7/11) and sees Dr Brownlee q 6-12 mo.        Assessment & Plan:  Patient still active with carpentry/etc., no ischemic symptoms, stable to continue aspirin and low-dose metoprolol.  He is followed closely by Dr. Refugio Mejia, no concerns from his perspective either.      2. Primary hypertension  Assessment & Plan:  Blood pressure remains well-controlled and his pulse is in the mid 50s as of his 12/23 office visit.  He is on just low-dose metoprolol given underlying CAD, continue same.    Orders:  -     Comprehensive Metabolic Panel; Future    3. Hypothyroidism due to acquired atrophy of thyroid  Assessment & Plan:  TSH 0.8 as of his 12/23 office visit.  This is up from 0.1 back in the summer, we  lowered him from 100 mcg to 88 mcg at that time.  Patient stable to continue this dose going forward.    Orders:  -     TSH; Future    4. Mixed hyperlipidemia  Assessment & Plan:  LDL of 101 is improved a little bit from 6 months ago, but not at goal given underlying CAD.  However patient is intolerant to statins, and he is on the only available doses of Nexletol and Zetia.  Fortunately no ischemic symptoms, continue current meds.    Orders:  -     Lipid Panel; Future    5. Drug-induced leukopenia  Assessment & Plan:  This is related to chronic sulfasalazine use.  White count fluctuates around 3.0, and that is where he sits as of 12/23.  His differential is fine, hemoglobin/platelet well normal as well.  Will keep an eye on this with his routine labs.    Orders:  -     CBC & Differential; Future    6. Well adult exam  -     Hemoglobin A1c; Future  -     Ferritin; Future  -     Iron Profile; Future  -     Vitamin B12 anemia; Future  -     Folate anemia; Future    7. Prostate cancer screening  -     PSA Screen; Future         --  --  OLDER NOTES:  ANNUAL PHYSICAL 4/21; d/w all labs; no ischemic sxs; no changes PFSH.  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    LIPIDS at goal with LDL of 74...82 with no changes yet...142 b/c taking PRN due to leg pain, so change to another... and is tolerating lipitor, so will titrate and blame the R leg pain on his back and eval when he desires...82, but will try 80 mg now...68, but , so need to drop back down and add zetia when LFT's normalize...on 40 Lipitor with qod Zetia=LDL 73 and ALT only 40, so go to qd on Zetia, but no more than 40 of lipitor...69 is best gonna get; LFT's neg 7/18 OV...81 is ok for now...120 in 1/20 b/c sick in hosp past month;  no changes needed...74 is goal... 59---> 80 in 4/21, so increase on RTO if same.  --  HTN remains well controlled=ditto 10/20.  --  HYPOTHYROIDISM...increase again 8/17...0.3 despite back on lower dose=112, so stay on it... stable on this dose 1/19...TSH 0.16, so rec try 100 in 7/19...was fine in 11/19, but 23 at 1/20 OV after a month in hosp; I d/w finish up the 112 he has on hand and then back to 100---> up 4/21, but no c/o, so defer RTO.  --  PORTAL VEIN THROMBOSIS occurred after the stenosis below, so it has been over 6 mo as of 10/20 OV and he has not had a restenosis after temp stents removed, so I agree with stopping Eliquis at this time---> no sxs this regard 4/21.  --  PSC CIRRHOSIS=dx'd 10/18 after elevated LFT's noted during eval of upper back pain with jaundice; s/p failed cannulation of CBD 12/21/18...admitted [facility] 12/19 with biliary stricture and had failed ERCP again; required percutaneous drain; concern for chloangiocarcinoma noted; was d/c on the 13 and readmitted on 12/26 and required stents; then 3rd admit for portal vein thrombosis; has f/u with Dr Pena...they need to put in permanent stents sometime, but pt wants to wait as of 4/20 due to virus...stents out 5/20 with stable labs...ditto 10/20 and they didn't call him back and that's ok since LFTs wnl---> AFP/lipase/LFT's neg 4/21.    ULCERATIVE COLITIS since '82 on below and with no recent flare...says no issues 7/19...c-scope 9/19 was neg---> no bowel issues 4/21.  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  --  VIT D DEF wnl at 37...later still.  R ULNAR ENTRAPMENT 2/18 = tender, so will try medrol; to ortho if no better... better after tx per chiropractor, so defer as of 7/18 OV.  --  PTOSIS...this is not MG, eval per optho for surgery...here for pre-op clearance...10/3...no CP/SOB, c/w meds, labs noted...s/p 10/13.  HOARSENESS d/w me at 7/18 OV; no  concerning meds, no sprays OTC, no A.R., no GERD; will ask ENT to eval for polyps.  --  --  PSA 2.9 (6/2/2023)  COLON 7/23 = wnl = follow-up per Dr Naranjo's recs.  (, 2 kids=son here and girl in [place], [facility]= still working with [employer] as of 12/23 OV).    Follow Up   Return in about 6 months (around 6/7/2024).  Patient was given instructions and counseling regarding his condition or for health maintenance advice. Please see specific information pulled into the AVS if appropriate.

## 2023-12-07 NOTE — PATIENT INSTRUCTIONS
You need to ensure your labs are drawn after June 2, otherwise Medicare will not pay for the PSA, this is the prostate blood test.

## 2023-12-07 NOTE — ASSESSMENT & PLAN NOTE
TSH 0.8 as of his 12/23 office visit.  This is up from 0.1 back in the summer, we lowered him from 100 mcg to 88 mcg at that time.  Patient stable to continue this dose going forward.

## 2023-12-07 NOTE — ASSESSMENT & PLAN NOTE
Blood pressure remains well-controlled and his pulse is in the mid 50s as of his 12/23 office visit.  He is on just low-dose metoprolol given underlying CAD, continue same.

## 2023-12-07 NOTE — ASSESSMENT & PLAN NOTE
LDL of 101 is improved a little bit from 6 months ago, but not at goal given underlying CAD.  However patient is intolerant to statins, and he is on the only available doses of Nexletol and Zetia.  Fortunately no ischemic symptoms, continue current meds.

## 2024-04-24 RX ORDER — FOLIC ACID 1 MG/1
1000 TABLET ORAL DAILY
Qty: 90 TABLET | Refills: 1 | Status: SHIPPED | OUTPATIENT
Start: 2024-04-24

## 2024-06-04 RX ORDER — LEVOTHYROXINE SODIUM 88 UG/1
88 TABLET ORAL DAILY
Qty: 90 TABLET | Refills: 1 | Status: SHIPPED | OUTPATIENT
Start: 2024-06-04

## 2024-06-12 ENCOUNTER — OFFICE VISIT (OUTPATIENT)
Dept: ORTHOPEDIC SURGERY | Facility: CLINIC | Age: 69
End: 2024-06-12
Payer: MEDICARE

## 2024-06-12 VITALS — WEIGHT: 150 LBS | HEIGHT: 71 IN | HEART RATE: 59 BPM | BODY MASS INDEX: 21 KG/M2 | OXYGEN SATURATION: 99 %

## 2024-06-12 DIAGNOSIS — M25.562 LEFT KNEE PAIN, UNSPECIFIED CHRONICITY: ICD-10-CM

## 2024-06-12 DIAGNOSIS — M25.561 RIGHT KNEE PAIN, UNSPECIFIED CHRONICITY: Primary | ICD-10-CM

## 2024-06-12 DIAGNOSIS — M17.0 BILATERAL PRIMARY OSTEOARTHRITIS OF KNEE: ICD-10-CM

## 2024-06-12 RX ORDER — LIDOCAINE HYDROCHLORIDE 10 MG/ML
5 INJECTION, SOLUTION INFILTRATION; PERINEURAL
Status: COMPLETED | OUTPATIENT
Start: 2024-06-12 | End: 2024-06-12

## 2024-06-12 RX ORDER — TRIAMCINOLONE ACETONIDE 40 MG/ML
40 INJECTION, SUSPENSION INTRA-ARTICULAR; INTRAMUSCULAR
Status: COMPLETED | OUTPATIENT
Start: 2024-06-12 | End: 2024-06-12

## 2024-06-12 RX ADMIN — TRIAMCINOLONE ACETONIDE 40 MG: 40 INJECTION, SUSPENSION INTRA-ARTICULAR; INTRAMUSCULAR at 09:34

## 2024-06-12 RX ADMIN — LIDOCAINE HYDROCHLORIDE 5 ML: 10 INJECTION, SOLUTION INFILTRATION; PERINEURAL at 09:35

## 2024-06-12 RX ADMIN — LIDOCAINE HYDROCHLORIDE 5 ML: 10 INJECTION, SOLUTION INFILTRATION; PERINEURAL at 09:34

## 2024-06-12 RX ADMIN — TRIAMCINOLONE ACETONIDE 40 MG: 40 INJECTION, SUSPENSION INTRA-ARTICULAR; INTRAMUSCULAR at 09:35

## 2024-06-12 NOTE — PROGRESS NOTES
"Chief Complaint  Pain and Initial Evaluation of the Left Knee and Pain and Initial Evaluation of the Right Knee    Subjective          Troy Vega presents to [facility] for an evaluation for his bilateral knee pain.     History of Present Illness    The patient presents here today for an evaluation of bilateral knee. His knee's have been bothering him for several years. He reports no prior injury, trauma or falls to his knee's. 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 He reports no prior treatment to his knee's. He has not had any prior surgery to either knee. He reports pain when going up and down the stairs and ladders. He is not able to take anti inflammatories due to stage four liver disease.     Allergies   Allergen Reactions    Levofloxacin Other (See Comments)     Destroyed his muscles    Oxycodone GI Intolerance    Oxycodone Hcl GI Intolerance        Social History     Socioeconomic History    Marital status:    Tobacco Use    Smoking status: Never     Passive exposure: Never    Smokeless tobacco: Never   Vaping Use    Vaping status: Never Used   Substance and Sexual Activity    Alcohol use: Never    Drug use: Never    Sexual activity: Defer        I reviewed the patient's chief complaint, history of present illness, review of systems, past medical history, surgical history, family history, social history, medications, and allergy list.     REVIEW OF SYSTEMS    Constitutional: Denies fevers, chills, weight loss  Cardiovascular: Denies chest pain, shortness of breath  Skin: Denies rashes, acute skin changes  Neurologic: Denies headache, loss of consciousness  MSK: Bilateral knee pain       Objective   Vital Signs:   Pulse 59   Ht 180.3 cm (71\")   Wt 68 kg (150 lb)   SpO2 99%   BMI 20.92 kg/m²     Body mass index is 20.92 kg/m².    Physical Exam    General: Alert. No acute distress.   Bilateral lower extremity: full extension, flexion to 130 degrees, crepitus with range " of motion, non tender to the medial joint line  and lateral joint line, smooth hip range of motion, negative  Lachman's, negative Harris's, calf soft, neurovascularly intact, positive EHL, FHL, GS, and TA. Sensation intact to all 5 nerves of the foot.        Large Joint Arthrocentesis: R knee  Date/Time: 6/12/2024 9:34 AM  Consent given by: patient  Site marked: site marked  Timeout: Immediately prior to procedure a time out was called to verify the correct patient, procedure, equipment, support staff and site/side marked as required   Supporting Documentation  Indications: pain   Procedure Details  Location: knee - R knee  Needle gauge: 21 G.  Medications administered: 5 mL lidocaine 1 %; 40 mg triamcinolone acetonide 40 MG/ML  Patient tolerance: patient tolerated the procedure well with no immediate complications      Large Joint Arthrocentesis: L knee  Date/Time: 6/12/2024 9:35 AM  Consent given by: patient  Site marked: site marked  Timeout: Immediately prior to procedure a time out was called to verify the correct patient, procedure, equipment, support staff and site/side marked as required   Supporting Documentation  Indications: pain   Procedure Details  Location: knee - L knee  Needle gauge: 21 G.  Medications administered: 5 mL lidocaine 1 %; 40 mg triamcinolone acetonide 40 MG/ML  Patient tolerance: patient tolerated the procedure well with no immediate complications      This injection documentation was Scribed for Keny Back MD by Marley Irving.  06/12/24   09:37 EDT    Imaging Results (Most Recent)       Procedure Component Value Units Date/Time    XR Knee 4+ View Left [[id number]] Resulted: 06/12/24 1001     Updated: 06/12/24 1002    Narrative:      Indications: Left knee pain    Views: Weightbearing AP, PA flexion, lateral, sunrise left knee    Findings: Mild patellofemoral degenerative change, mild medial compartment   loss of articular height.  No fractures.    Comparative Data: No comparative  data available    XR Knee 4+ View Right [[id number]] Resulted: 06/12/24 1001     Updated: 06/12/24 1001    Narrative:      Indications: Right knee pain    Views: Weightbearing AP, PA flexion, lateral, sunrise right knee    Findings: Moderate patellofemoral degenerative change.  No fractures   noted.    Comparative Data: No comparative data available                     Assessment and Plan        XR Knee 4+ View Left    Result Date: 6/12/2024  Narrative: Indications: Left knee pain Views: Weightbearing AP, PA flexion, lateral, sunrise left knee Findings: Mild patellofemoral degenerative change, mild medial compartment loss of articular height.  No fractures. Comparative Data: No comparative data available    XR Knee 4+ View Right    Result Date: 6/12/2024  Narrative: Indications: Right knee pain Views: Weightbearing AP, PA flexion, lateral, sunrise right knee Findings: Moderate patellofemoral degenerative change.  No fractures noted. Comparative Data: No comparative data available      Diagnoses and all orders for this visit:    1. Right knee pain, unspecified chronicity (Primary)  -     XR Knee 4+ View Right    2. Left knee pain, unspecified chronicity  -     XR Knee 4+ View Left    3. Bilateral primary osteoarthritis of knee    Other orders  -     Large Joint Arthrocentesis: R knee  -     Large Joint Arthrocentesis: L knee        The patient presents here today for an evaluation of bilateral knee pain. X-rays were obtained in the office today and these were reviewed today.     Discussed the risks and benefits of bilateral knee steroid injections today. Patient expressed understanding and wishes to proceed. He tolerated the injections well and without any complications.     Home exercises given to the patient today.        Call or return if worsening symptoms.    Scribed for Keny Back MD by Amena Jennings  06/12/2024   08:57 EDT         Follow Up       3 months     Patient was given instructions and counseling  regarding his condition or for health maintenance advice. Please see specific information pulled into the AVS if appropriate.     I have personally performed the services described in this document as scribed by the above individual and it is both accurate and complete. Keny Back MD 06/12/24 10:23 EDT

## 2024-06-13 ENCOUNTER — LAB (OUTPATIENT)
Dept: LAB | Facility: HOSPITAL | Age: 69
End: 2024-06-13
Payer: MEDICARE

## 2024-06-13 DIAGNOSIS — I10 PRIMARY HYPERTENSION: ICD-10-CM

## 2024-06-13 DIAGNOSIS — Z00.00 WELL ADULT EXAM: ICD-10-CM

## 2024-06-13 DIAGNOSIS — Z12.5 PROSTATE CANCER SCREENING: ICD-10-CM

## 2024-06-13 DIAGNOSIS — E03.4 HYPOTHYROIDISM DUE TO ACQUIRED ATROPHY OF THYROID: ICD-10-CM

## 2024-06-13 DIAGNOSIS — D70.2 DRUG-INDUCED LEUKOPENIA: ICD-10-CM

## 2024-06-13 DIAGNOSIS — E78.2 MIXED HYPERLIPIDEMIA: ICD-10-CM

## 2024-06-13 LAB
ALBUMIN SERPL-MCNC: 5 G/DL (ref 3.5–5.2)
ALBUMIN/GLOB SERPL: 2 G/DL
ALP SERPL-CCNC: 72 U/L (ref 39–117)
ALT SERPL W P-5'-P-CCNC: 26 U/L (ref 1–41)
ANION GAP SERPL CALCULATED.3IONS-SCNC: 8 MMOL/L (ref 5–15)
AST SERPL-CCNC: 28 U/L (ref 1–40)
BASOPHILS # BLD AUTO: 0.03 10*3/MM3 (ref 0–0.2)
BASOPHILS NFR BLD AUTO: 0.5 % (ref 0–1.5)
BILIRUB SERPL-MCNC: 0.6 MG/DL (ref 0–1.2)
BUN SERPL-MCNC: 16 MG/DL (ref 8–23)
BUN/CREAT SERPL: 17.6 (ref 7–25)
CALCIUM SPEC-SCNC: 9.4 MG/DL (ref 8.6–10.5)
CHLORIDE SERPL-SCNC: 102 MMOL/L (ref 98–107)
CHOLEST SERPL-MCNC: 167 MG/DL (ref 0–200)
CO2 SERPL-SCNC: 28 MMOL/L (ref 22–29)
CREAT SERPL-MCNC: 0.91 MG/DL (ref 0.76–1.27)
DEPRECATED RDW RBC AUTO: 40.7 FL (ref 37–54)
EGFRCR SERPLBLD CKD-EPI 2021: 92.4 ML/MIN/1.73
EOSINOPHIL # BLD AUTO: 0 10*3/MM3 (ref 0–0.4)
EOSINOPHIL NFR BLD AUTO: 0 % (ref 0.3–6.2)
ERYTHROCYTE [DISTWIDTH] IN BLOOD BY AUTOMATED COUNT: 11.8 % (ref 12.3–15.4)
FERRITIN SERPL-MCNC: 280 NG/ML (ref 30–400)
FOLATE SERPL-MCNC: >20 NG/ML (ref 4.78–24.2)
GLOBULIN UR ELPH-MCNC: 2.5 GM/DL
GLUCOSE SERPL-MCNC: 99 MG/DL (ref 65–99)
HBA1C MFR BLD: 5.6 % (ref 4.8–5.6)
HCT VFR BLD AUTO: 40.3 % (ref 37.5–51)
HDLC SERPL-MCNC: 47 MG/DL (ref 40–60)
HGB BLD-MCNC: 13.5 G/DL (ref 13–17.7)
IMM GRANULOCYTES # BLD AUTO: 0.01 10*3/MM3 (ref 0–0.05)
IMM GRANULOCYTES NFR BLD AUTO: 0.2 % (ref 0–0.5)
IRON 24H UR-MRATE: 143 MCG/DL (ref 59–158)
IRON SATN MFR SERPL: 40 % (ref 20–50)
LDLC SERPL CALC-MCNC: 105 MG/DL (ref 0–100)
LDLC/HDLC SERPL: 2.22 {RATIO}
LYMPHOCYTES # BLD AUTO: 1.01 10*3/MM3 (ref 0.7–3.1)
LYMPHOCYTES NFR BLD AUTO: 17.9 % (ref 19.6–45.3)
MCH RBC QN AUTO: 31.3 PG (ref 26.6–33)
MCHC RBC AUTO-ENTMCNC: 33.5 G/DL (ref 31.5–35.7)
MCV RBC AUTO: 93.5 FL (ref 79–97)
MONOCYTES # BLD AUTO: 0.56 10*3/MM3 (ref 0.1–0.9)
MONOCYTES NFR BLD AUTO: 9.9 % (ref 5–12)
NEUTROPHILS NFR BLD AUTO: 4.04 10*3/MM3 (ref 1.7–7)
NEUTROPHILS NFR BLD AUTO: 71.5 % (ref 42.7–76)
NRBC BLD AUTO-RTO: 0 /100 WBC (ref 0–0.2)
PLATELET # BLD AUTO: 291 10*3/MM3 (ref 140–450)
PMV BLD AUTO: 10 FL (ref 6–12)
POTASSIUM SERPL-SCNC: 3.9 MMOL/L (ref 3.5–5.2)
PROT SERPL-MCNC: 7.5 G/DL (ref 6–8.5)
PSA SERPL-MCNC: 3.35 NG/ML (ref 0–4)
RBC # BLD AUTO: 4.31 10*6/MM3 (ref 4.14–5.8)
SODIUM SERPL-SCNC: 138 MMOL/L (ref 136–145)
TIBC SERPL-MCNC: 356 MCG/DL (ref 298–536)
TRANSFERRIN SERPL-MCNC: 239 MG/DL (ref 200–360)
TRIGL SERPL-MCNC: 78 MG/DL (ref 0–150)
TSH SERPL DL<=0.05 MIU/L-ACNC: 0.17 UIU/ML (ref 0.27–4.2)
VIT B12 BLD-MCNC: 399 PG/ML (ref 211–946)
VLDLC SERPL-MCNC: 15 MG/DL (ref 5–40)
WBC NRBC COR # BLD AUTO: 5.65 10*3/MM3 (ref 3.4–10.8)

## 2024-06-13 PROCEDURE — G0103 PSA SCREENING: HCPCS

## 2024-06-13 PROCEDURE — 83540 ASSAY OF IRON: CPT

## 2024-06-13 PROCEDURE — 82728 ASSAY OF FERRITIN: CPT

## 2024-06-13 PROCEDURE — 82746 ASSAY OF FOLIC ACID SERUM: CPT

## 2024-06-13 PROCEDURE — 84443 ASSAY THYROID STIM HORMONE: CPT

## 2024-06-13 PROCEDURE — 83036 HEMOGLOBIN GLYCOSYLATED A1C: CPT

## 2024-06-13 PROCEDURE — 80053 COMPREHEN METABOLIC PANEL: CPT

## 2024-06-13 PROCEDURE — 82607 VITAMIN B-12: CPT

## 2024-06-13 PROCEDURE — 84466 ASSAY OF TRANSFERRIN: CPT

## 2024-06-13 PROCEDURE — 85025 COMPLETE CBC W/AUTO DIFF WBC: CPT

## 2024-06-13 PROCEDURE — 80061 LIPID PANEL: CPT

## 2024-06-13 PROCEDURE — 36415 COLL VENOUS BLD VENIPUNCTURE: CPT

## 2024-06-18 ENCOUNTER — OFFICE VISIT (OUTPATIENT)
Dept: INTERNAL MEDICINE | Age: 69
End: 2024-06-18
Payer: MEDICARE

## 2024-06-18 VITALS
HEIGHT: 71 IN | DIASTOLIC BLOOD PRESSURE: 60 MMHG | WEIGHT: 150.4 LBS | HEART RATE: 72 BPM | OXYGEN SATURATION: 98 % | SYSTOLIC BLOOD PRESSURE: 132 MMHG | TEMPERATURE: 98 F | BODY MASS INDEX: 21.06 KG/M2

## 2024-06-18 DIAGNOSIS — Z95.1 HX OF THREE VESSEL CORONARY ARTERY BYPASS: ICD-10-CM

## 2024-06-18 DIAGNOSIS — E78.2 MIXED HYPERLIPIDEMIA: ICD-10-CM

## 2024-06-18 DIAGNOSIS — I10 PRIMARY HYPERTENSION: ICD-10-CM

## 2024-06-18 DIAGNOSIS — Z00.00 MEDICARE ANNUAL WELLNESS VISIT, SUBSEQUENT: Primary | ICD-10-CM

## 2024-06-18 DIAGNOSIS — E03.4 HYPOTHYROIDISM DUE TO ACQUIRED ATROPHY OF THYROID: ICD-10-CM

## 2024-06-18 DIAGNOSIS — M15.9 PRIMARY OSTEOARTHRITIS INVOLVING MULTIPLE JOINTS: ICD-10-CM

## 2024-06-18 PROBLEM — M15.0 PRIMARY OSTEOARTHRITIS INVOLVING MULTIPLE JOINTS: Status: ACTIVE | Noted: 2024-06-18

## 2024-06-18 PROCEDURE — 3078F DIAST BP <80 MM HG: CPT | Performed by: INTERNAL MEDICINE

## 2024-06-18 PROCEDURE — 99214 OFFICE O/P EST MOD 30 MIN: CPT | Performed by: INTERNAL MEDICINE

## 2024-06-18 PROCEDURE — 1159F MED LIST DOCD IN RCRD: CPT | Performed by: INTERNAL MEDICINE

## 2024-06-18 PROCEDURE — 1160F RVW MEDS BY RX/DR IN RCRD: CPT | Performed by: INTERNAL MEDICINE

## 2024-06-18 PROCEDURE — 96160 PT-FOCUSED HLTH RISK ASSMT: CPT | Performed by: INTERNAL MEDICINE

## 2024-06-18 PROCEDURE — G0439 PPPS, SUBSEQ VISIT: HCPCS | Performed by: INTERNAL MEDICINE

## 2024-06-18 PROCEDURE — 1170F FXNL STATUS ASSESSED: CPT | Performed by: INTERNAL MEDICINE

## 2024-06-18 PROCEDURE — 3075F SYST BP GE 130 - 139MM HG: CPT | Performed by: INTERNAL MEDICINE

## 2024-06-18 NOTE — ASSESSMENT & PLAN NOTE
LDL is stuck at 105 as of his 6/24 OV.  He is on Nexletol and Zetia, he is intolerant to statins.  He may benefit from Repatha or one of the other newer injection treatments, we will have to defer this to his cardiologist though.

## 2024-06-18 NOTE — PROGRESS NOTES
"Chief Complaint  Hyperlipidemia and Follow-up (Pt states that this is routine, he had labs, he had injections in both knees with Dr. Back. )    Subjective          Troy Vega presents to [facility]     Hyperlipidemia  Pertinent negatives include no chest pain or shortness of breath.     History of present illness:  Patient is a pleasant 68-year-old male with underlying coronary artery disease prior bypass, Crohn's disease, hypertension and hyperlipidemia, who is here 6/24 for routine 6-month follow-up.  We will review his labs and make further recommendations at that time.    Review of Systems   Constitutional:  Negative for appetite change, fatigue and fever.   HENT:  Negative for congestion and ear pain.    Eyes:  Negative for blurred vision.   Respiratory:  Negative for cough, chest tightness, shortness of breath and wheezing.    Cardiovascular:  Negative for chest pain, palpitations and leg swelling.   Gastrointestinal:  Negative for abdominal pain.   Genitourinary:  Negative for difficulty urinating, dysuria and hematuria.   Musculoskeletal:  Negative for arthralgias and gait problem.   Skin:  Negative for skin lesions.   Neurological:  Negative for syncope, memory problem and confusion.   Psychiatric/Behavioral:  Negative for self-injury and depressed mood.        Objective   Vital Signs:   /60   Pulse 72   Temp 98 °F (36.7 °C) (Skin)   Ht 180.3 cm (70.98\")   Wt 68.2 kg (150 lb 6.4 oz)   SpO2 98%   BMI 20.99 kg/m²           Physical Exam  Vitals and nursing note reviewed.   Constitutional:       General: He is not in acute distress.     Appearance: Normal appearance. He is not toxic-appearing.   HENT:      Head: Atraumatic.      Right Ear: External ear normal.      Left Ear: External ear normal.      Nose: Nose normal.      Mouth/Throat:      Mouth: Mucous membranes are moist.   Eyes:      General:         Right eye: No discharge.         Left eye: No " discharge.      Extraocular Movements: Extraocular movements intact.      Pupils: Pupils are equal, round, and reactive to light.   Cardiovascular:      Rate and Rhythm: Normal rate and regular rhythm.      Pulses: Normal pulses.      Heart sounds: Normal heart sounds. No murmur heard.     No gallop.   Pulmonary:      Effort: Pulmonary effort is normal. No respiratory distress.      Breath sounds: No wheezing, rhonchi or rales.   Abdominal:      General: There is no distension.      Palpations: Abdomen is soft. There is no mass.      Tenderness: There is no abdominal tenderness. There is no guarding.   Musculoskeletal:         General: No swelling or tenderness.      Cervical back: No tenderness.      Right lower leg: No edema.      Left lower leg: No edema.   Skin:     General: Skin is warm and dry.      Findings: No rash.   Neurological:      General: No focal deficit present.      Mental Status: He is alert and oriented to person, place, and time. Mental status is at baseline.      Motor: No weakness.      Gait: Gait normal.   Psychiatric:         Mood and Affect: Mood normal.         Thought Content: Thought content normal.          Result Review :   The following data was reviewed by: Rolando Lopez MD on 10/15/2021:                  Assessment and Plan    Diagnoses and all orders for this visit:    1. Medicare annual wellness visit, subsequent (Primary)  Assessment & Plan:  AWV completed 6/24.  Patient remains active and independent.  Patient with no falls and no hospitalizations in the past year.  He has a living will, asked him to bring us a copy at his next appointment   so we can get it in the system    Orders:  -     Hemoglobin A1c; Future    2. Primary osteoarthritis involving multiple joints  Assessment & Plan:  Patient saw Dr. Back just last week as of his 6/24 OV.  He is having bilateral knee pain, imaging revealed right greater than left degenerative arthritis.  Patient was treated with  corticosteroid injection.      3. Primary hypertension  Assessment & Plan:  Blood pressure is well-controlled and his pulse is around 70 as of his 6/24 OV.  He is maintained on just low-dose metoprolol for his underlying CAD, stable to continue same.    Orders:  -     Comprehensive Metabolic Panel; Future    4. Mixed hyperlipidemia  Assessment & Plan:   LDL is stuck at 105 as of his 6/24 OV.  He is on Nexletol and Zetia, he is intolerant to statins.  He may benefit from Repatha or one of the other newer injection treatments, we will have to defer this to his cardiologist though.    Orders:  -     Lipid Panel; Future    5. Hypothyroidism due to acquired atrophy of thyroid  Assessment & Plan:  TSH was up from 0.2 to 0.8 we saw him in 12/23.  That was after we lowered his dose from 100 to 88 mcg.  Patient compliant with treatment, his weight is unchanged, but his TSH is back down to 0.2 presently.  He appears asymptomatic from this, his pulse is right around 70, I think we will defer any changes at this time, patient to call if he has symptoms.    Orders:  -     TSH; Future    6. Hx of three vessel coronary artery bypass  Overview:  CAD/CABG 3V (7/11) and sees Dr Brownlee q 6-12 mo.    Assessment & Plan:  As noted, patient is 13 years out from his procedure now.  He remains without any ischemic symptoms, he is on aspirin and metoprolol for antianginal benefit, has Nitrostat available.    He is followed closely by Dr. Refugio Mejia, will defer to him in regards to Repatha/etc. as noted underneath the hyper lipidemia heading below.             --  --  OLDER NOTES:  ANNUAL PHYSICAL 4/21; d/w all labs; no ischemic sxs; no changes PFSH.  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    LIPIDS at goal with LDL of  74...82 with no changes yet...142 b/c taking PRN due to leg pain, so change to another... and is tolerating lipitor, so will titrate and blame the R leg pain on his back and eval when he desires...82, but will try 80 mg now...68, but , so need to drop back down and add zetia when LFT's normalize...on 40 Lipitor with qod Zetia=LDL 73 and ALT only 40, so go to qd on Zetia, but no more than 40 of lipitor...69 is best gonna get; LFT's neg 7/18 OV...81 is ok for now...120 in 1/20 b/c sick in hosp past month; no changes needed...74 is goal... 59---> 80 in 4/21, so increase on RTO if same.  --  HTN remains well controlled=ditto 10/20.  --  HYPOTHYROIDISM...increase again 8/17...0.3 despite back on lower dose=112, so stay on it... stable on this dose 1/19...TSH 0.16, so rec try 100 in 7/19...was fine in 11/19, but 23 at 1/20 OV after a month in hosp; I d/w finish up the 112 he has on hand and then back to 100---> up 4/21, but no c/o, so defer RTO.  --  PORTAL VEIN THROMBOSIS occurred after the stenosis below, so it has been over 6 mo as of 10/20 OV and he has not had a restenosis after temp stents removed, so I agree with stopping Eliquis at this time---> no sxs this regard 4/21.  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    ULCERATIVE COLITIS since '82 on below and with no recent flare...says no issues 7/19...c-scope  9/19 was neg---> no bowel issues 4/21.  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  --  VIT D DEF wnl at 37...later still.  R ULNAR ENTRAPMENT 2/18 = tender, so will try medrol; to ortho if no better... better after tx per chiropractor, so defer as of 7/18 OV.  --  PTOSIS...this is not MG, eval per optho for surgery...here for pre-op clearance...10/3...no CP/SOB, c/w meds, labs noted...s/p 10/13.  HOARSENESS d/w me at 7/18 OV; no concerning meds, no sprays OTC, no A.R., no GERD; will ask ENT to eval for polyps.  --  --  PSA 3.3 (6/13/2024)  COLON 7/23 = wnl = follow-up per Dr Naranjo's recs.  (, 2 kids=son here and girl in [facility]= still working with [employer] as of 12/23 OV).    Follow Up   Return in about 6 months (around 12/18/2024).  Patient was given instructions and counseling regarding his condition or for health maintenance advice. Please see specific information pulled into the AVS if appropriate.

## 2024-06-18 NOTE — PROGRESS NOTES
The ABCs of the Annual Wellness Visit  Subsequent Medicare Wellness Visit    Subjective      Troy Vega is a 68 y.o. male who presents for a Subsequent Medicare Wellness Visit.    The following portions of the patient's history were reviewed and   updated as appropriate: allergies, current medications, past family history, past medical history, past social history, past surgical history, and problem list.    Compared to one year ago, the patient feels his physical   health is the same.---> Except in regards to DJD, recent issues with knees as of 6/24.    Compared to one year ago, the patient feels his mental   health is the same.    Recent Hospitalizations:  He was not admitted to the hospital during the last year.       Current Medical Providers:  Patient Care Team:  Rolando Lopez MD as PCP - General (Internal Medicine)  Humera Brownlee MD as Consulting Physician (Cardiology)  Keny Back MD as Consulting Physician (Orthopedic Surgery)    Outpatient Medications Prior to Visit   Medication Sig Dispense Refill    aspirin 81 MG EC tablet Take 1 tablet by mouth Daily.      ezetimibe (ZETIA) 10 MG tablet Take 1 tablet by mouth Daily. 90 tablet 1    folic acid (FOLVITE) 1 MG tablet TAKE 1 TABLET BY MOUTH DAILY 90 tablet 1    levothyroxine (SYNTHROID, LEVOTHROID) 88 MCG tablet TAKE 1 TABLET BY MOUTH DAILY 90 tablet 1    metoprolol succinate XL (TOPROL-XL) 25 MG 24 hr tablet Take 1 tablet by mouth Daily.      Multiple Vitamin (multivitamin) capsule Take 1 capsule by mouth Daily.      Nexletol 180 MG tablet Take 1 tablet by mouth Daily.      nitroglycerin (NITROSTAT) 0.4 MG SL tablet       sulfaSALAzine (AZULFIDINE) 500 MG tablet TAKE TWO TABLETS BY MOUTH TWICE A  tablet 3     No facility-administered medications prior to visit.       No opioid medication identified on active medication list. I have reviewed chart for other potential  high risk medication/s and harmful drug interactions in the  "elderly.        Aspirin is on active medication list. Aspirin use is indicated based on review of current medical condition/s. Pros and cons of this therapy have been discussed today. Benefits of this medication outweigh potential harm.  Patient has been encouraged to continue taking this medication.  .      Patient Active Problem List   Diagnosis    Hx of three vessel coronary artery bypass    PSC (primary sclerosing cholangitis)    Primary hypertension    Hypothyroidism due to acquired atrophy of thyroid    Mixed hyperlipidemia    Ulcerative colitis    Drug-induced leukopenia    Chronic left-sided low back pain with left-sided sciatica    Medicare annual wellness visit, subsequent    Hoarseness of voice    Primary osteoarthritis involving multiple joints     Advance Care Planning   Advance Care Planning     Advance Directive is not on file.  ACP discussion was held with the patient during this visit. Patient has an advance directive (not in EMR), copy requested.     Objective    Vitals:    06/18/24 1148   BP: 132/60   Pulse: 72   Temp: 98 °F (36.7 °C)   TempSrc: Skin   SpO2: 98%   Weight: 68.2 kg (150 lb 6.4 oz)   Height: 180.3 cm (70.98\")     Estimated body mass index is 20.99 kg/m² as calculated from the following:    Height as of this encounter: 180.3 cm (70.98\").    Weight as of this encounter: 68.2 kg (150 lb 6.4 oz).    BMI is within normal parameters. No other follow-up for BMI required.      Does the patient have evidence of cognitive impairment?   No    Lab Results   Component Value Date    TRIG 78 06/13/2024    HDL 47 06/13/2024     (H) 06/13/2024    VLDL 15 06/13/2024    HGBA1C 5.60 06/13/2024          HEALTH RISK ASSESSMENT    Smoking Status:  Social History     Tobacco Use   Smoking Status Never    Passive exposure: Never   Smokeless Tobacco Never     Alcohol Consumption:  Social History     Substance and Sexual Activity   Alcohol Use Never     Fall Risk Screen:    STEADI Fall Risk Assessment " was completed, and patient is at LOW risk for falls.Assessment completed on:2024    Depression Screenin/18/2024    11:47 AM   PHQ-2/PHQ-9 Depression Screening   Little Interest or Pleasure in Doing Things 0-->not at all   Feeling Down, Depressed or Hopeless 0-->not at all   PHQ-9: Brief Depression Severity Measure Score 0       Health Habits and Functional and Cognitive Screenin/18/2024    11:00 AM   Functional & Cognitive Status   Do you have difficulty preparing food and eating? No   Do you have difficulty bathing yourself, getting dressed or grooming yourself? No   Do you have difficulty using the toilet? No   Do you have difficulty moving around from place to place? No   Do you have trouble with steps or getting out of a bed or a chair? No   Current Diet Well Balanced Diet   Dental Exam Up to date   Eye Exam Up to date   Exercise (times per week) 7 times per week   Current Exercises Include Other;Walking;Yard Work   Do you need help using the phone?  No   Are you deaf or do you have serious difficulty hearing?  No   Do you need help to go to places out of walking distance? No   Do you need help shopping? No   Do you need help preparing meals?  No   Do you need help with housework?  No   Do you need help with laundry? No   Do you need help taking your medications? No   Do you need help managing money? No   Do you ever drive or ride in a car without wearing a seat belt? No   Have you felt unusual stress, anger or loneliness in the last month? No   Who do you live with? Alone   If you need help, do you have trouble finding someone available to you? No   Have you been bothered in the last four weeks by sexual problems? No   Do you have difficulty concentrating, remembering or making decisions? No       Age-appropriate Screening Schedule:  Refer to the list below for future screening recommendations based on patient's age, sex and/or medical conditions. Orders for these recommended tests are  listed in the plan section. The patient has been provided with a written plan.    Health Maintenance   Topic Date Due    ZOSTER VACCINE (1 of 2) Never done    Hepatitis B (1 of 3 - Risk 3-dose series) Never done    RSV Vaccine - Adults (1 - 1-dose 60+ series) Never done    ANNUAL WELLNESS VISIT  06/07/2024    COLORECTAL CANCER SCREENING  07/26/2024    INFLUENZA VACCINE  08/01/2024    LIPID PANEL  06/13/2025    TDAP/TD VACCINES (2 - Td or Tdap) 12/07/2032    HEPATITIS C SCREENING  Completed    Pneumococcal Vaccine 65+  Completed    COVID-19 Vaccine  Discontinued                  CMS Preventative Services Quick Reference  Risk Factors Identified During Encounter:    None Identified    The above risks/problems have been discussed with the patient.  Pertinent information has been shared with the patient in the After Visit Summary.    Diagnoses and all orders for this visit:    1. Medicare annual wellness visit, subsequent (Primary)  Assessment & Plan:  AWV completed 6/24.  Patient remains active and independent.  Patient with no falls and no hospitalizations in the past year.  He has a living will, asked him to bring us a copy at his next appointment   so we can get it in the system      2. Primary osteoarthritis involving multiple joints  Assessment & Plan:  Patient saw Dr. Back just last week as of his 6/24 OV.  He is having bilateral knee pain, imaging revealed right greater than left degenerative arthritis.  Patient was treated with corticosteroid injection.      3. Primary hypertension  Assessment & Plan:  Blood pressure is well-controlled and his pulse is around 70 as of his 6/24 OV.  He is maintained on just low-dose metoprolol for his underlying CAD, stable to continue same.      4. Mixed hyperlipidemia  Assessment & Plan:   LDL is stuck at 105 as of his 6/24 OV.  He is on Nexletol and Zetia, he is intolerant to statins.  He may benefit from Repatha or one of the other newer injection treatments, we will have  to defer this to his cardiologist though.      5. Hypothyroidism due to acquired atrophy of thyroid    6. Hx of three vessel coronary artery bypass  Assessment & Plan:  As noted, patient is 13 years out from his procedure now.  He remains without any ischemic symptoms, he is on aspirin and metoprolol for antianginal benefit, has Nitrostat available.    He is followed closely by Dr. Refugio Mejia, will defer to him in regards to Repatha/etc. as noted underneath the hyper lipidemia heading below.          Follow Up:   Next Medicare Wellness visit to be scheduled in 1 year.      An After Visit Summary and PPPS were made available to the patient.

## 2024-06-18 NOTE — ASSESSMENT & PLAN NOTE
TSH was up from 0.2 to 0.8 we saw him in 12/23.  That was after we lowered his dose from 100 to 88 mcg.  Patient compliant with treatment, his weight is unchanged, but his TSH is back down to 0.2 presently.  He appears asymptomatic from this, his pulse is right around 70, I think we will defer any changes at this time, patient to call if he has symptoms.

## 2024-06-18 NOTE — ASSESSMENT & PLAN NOTE
As noted, patient is 13 years out from his procedure now.  He remains without any ischemic symptoms, he is on aspirin and metoprolol for antianginal benefit, has Nitrostat available.    He is followed closely by Dr. Refugio Mejia, will defer to him in regards to Repatha/etc. as noted underneath the hyper lipidemia heading below.

## 2024-06-18 NOTE — ASSESSMENT & PLAN NOTE
Patient saw Dr. Back just last week as of his 6/24 OV.  He is having bilateral knee pain, imaging revealed right greater than left degenerative arthritis.  Patient was treated with corticosteroid injection.

## 2024-06-18 NOTE — ASSESSMENT & PLAN NOTE
MIKALAV completed 6/24.  Patient remains active and independent.  Patient with no falls and no hospitalizations in the past year.  He has a living will, asked him to bring us a copy at his next appointment   so we can get it in the system

## 2024-06-18 NOTE — ASSESSMENT & PLAN NOTE
Blood pressure is well-controlled and his pulse is around 70 as of his 6/24 OV.  He is maintained on just low-dose metoprolol for his underlying CAD, stable to continue same.

## 2024-10-25 RX ORDER — FOLIC ACID 1 MG/1
1000 TABLET ORAL DAILY
Qty: 90 TABLET | Refills: 1 | Status: SHIPPED | OUTPATIENT
Start: 2024-10-25

## 2024-12-11 ENCOUNTER — PREP FOR SURGERY (OUTPATIENT)
Dept: OTHER | Facility: HOSPITAL | Age: 69
End: 2024-12-11
Payer: MEDICARE

## 2024-12-11 DIAGNOSIS — I20.0: ICD-10-CM

## 2024-12-11 DIAGNOSIS — R94.39 ABNORMAL STRESS TEST: Primary | ICD-10-CM

## 2024-12-11 DIAGNOSIS — Z01.810: ICD-10-CM

## 2024-12-11 RX ORDER — SODIUM CHLORIDE 9 MG/ML
40 INJECTION, SOLUTION INTRAVENOUS AS NEEDED
Status: CANCELLED | OUTPATIENT
Start: 2024-12-11

## 2024-12-11 RX ORDER — SODIUM CHLORIDE 0.9 % (FLUSH) 0.9 %
10 SYRINGE (ML) INJECTION AS NEEDED
Status: CANCELLED | OUTPATIENT
Start: 2024-12-11

## 2024-12-11 RX ORDER — SODIUM CHLORIDE 0.9 % (FLUSH) 0.9 %
10 SYRINGE (ML) INJECTION EVERY 12 HOURS SCHEDULED
Status: CANCELLED | OUTPATIENT
Start: 2024-12-11

## 2024-12-12 RX ORDER — LEVOTHYROXINE SODIUM 88 UG/1
88 TABLET ORAL DAILY
Qty: 90 TABLET | Refills: 1 | Status: SHIPPED | OUTPATIENT
Start: 2024-12-12

## 2024-12-16 ENCOUNTER — LAB (OUTPATIENT)
Facility: HOSPITAL | Age: 69
End: 2024-12-16
Payer: MEDICARE

## 2024-12-16 ENCOUNTER — PREP FOR SURGERY (OUTPATIENT)
Dept: OTHER | Facility: HOSPITAL | Age: 69
End: 2024-12-16
Payer: MEDICARE

## 2024-12-16 DIAGNOSIS — R94.39 ABNORMAL STRESS TEST: ICD-10-CM

## 2024-12-16 DIAGNOSIS — Z00.00 MEDICARE ANNUAL WELLNESS VISIT, SUBSEQUENT: ICD-10-CM

## 2024-12-16 DIAGNOSIS — Z01.810: ICD-10-CM

## 2024-12-16 DIAGNOSIS — E78.2 MIXED HYPERLIPIDEMIA: ICD-10-CM

## 2024-12-16 DIAGNOSIS — I20.0: ICD-10-CM

## 2024-12-16 DIAGNOSIS — R94.39 ABNORMAL STRESS TEST: Primary | ICD-10-CM

## 2024-12-16 DIAGNOSIS — I10 PRIMARY HYPERTENSION: ICD-10-CM

## 2024-12-16 DIAGNOSIS — E03.4 HYPOTHYROIDISM DUE TO ACQUIRED ATROPHY OF THYROID: ICD-10-CM

## 2024-12-16 LAB
ALBUMIN SERPL-MCNC: 4.6 G/DL (ref 3.5–5.2)
ALBUMIN/GLOB SERPL: 1.7 G/DL
ALP SERPL-CCNC: 82 U/L (ref 39–117)
ALT SERPL W P-5'-P-CCNC: 22 U/L (ref 1–41)
ANION GAP SERPL CALCULATED.3IONS-SCNC: 8 MMOL/L (ref 5–15)
AST SERPL-CCNC: 31 U/L (ref 1–40)
BASOPHILS # BLD AUTO: 0.04 10*3/MM3 (ref 0–0.2)
BASOPHILS NFR BLD AUTO: 1.4 % (ref 0–1.5)
BILIRUB SERPL-MCNC: 0.6 MG/DL (ref 0–1.2)
BUN SERPL-MCNC: 13 MG/DL (ref 8–23)
BUN/CREAT SERPL: 13 (ref 7–25)
CALCIUM SPEC-SCNC: 9.5 MG/DL (ref 8.6–10.5)
CHLORIDE SERPL-SCNC: 104 MMOL/L (ref 98–107)
CHOLEST SERPL-MCNC: 177 MG/DL (ref 0–200)
CO2 SERPL-SCNC: 28 MMOL/L (ref 22–29)
CREAT SERPL-MCNC: 1 MG/DL (ref 0.76–1.27)
DEPRECATED RDW RBC AUTO: 39.5 FL (ref 37–54)
EGFRCR SERPLBLD CKD-EPI 2021: 82 ML/MIN/1.73
EOSINOPHIL # BLD AUTO: 0.05 10*3/MM3 (ref 0–0.4)
EOSINOPHIL NFR BLD AUTO: 1.7 % (ref 0.3–6.2)
ERYTHROCYTE [DISTWIDTH] IN BLOOD BY AUTOMATED COUNT: 11.6 % (ref 12.3–15.4)
GLOBULIN UR ELPH-MCNC: 2.7 GM/DL
GLUCOSE SERPL-MCNC: 86 MG/DL (ref 65–99)
HBA1C MFR BLD: 5.1 % (ref 4.8–5.6)
HCT VFR BLD AUTO: 40.7 % (ref 37.5–51)
HDLC SERPL-MCNC: 43 MG/DL (ref 40–60)
HGB BLD-MCNC: 13.5 G/DL (ref 13–17.7)
IMM GRANULOCYTES # BLD AUTO: 0 10*3/MM3 (ref 0–0.05)
IMM GRANULOCYTES NFR BLD AUTO: 0 % (ref 0–0.5)
INR PPP: 1.08 (ref 0.86–1.15)
LDLC SERPL CALC-MCNC: 114 MG/DL (ref 0–100)
LDLC/HDLC SERPL: 2.6 {RATIO}
LYMPHOCYTES # BLD AUTO: 1.05 10*3/MM3 (ref 0.7–3.1)
LYMPHOCYTES NFR BLD AUTO: 35.7 % (ref 19.6–45.3)
MCH RBC QN AUTO: 31.2 PG (ref 26.6–33)
MCHC RBC AUTO-ENTMCNC: 33.2 G/DL (ref 31.5–35.7)
MCV RBC AUTO: 94 FL (ref 79–97)
MONOCYTES # BLD AUTO: 0.31 10*3/MM3 (ref 0.1–0.9)
MONOCYTES NFR BLD AUTO: 10.5 % (ref 5–12)
NEUTROPHILS NFR BLD AUTO: 1.49 10*3/MM3 (ref 1.7–7)
NEUTROPHILS NFR BLD AUTO: 50.7 % (ref 42.7–76)
NRBC BLD AUTO-RTO: 0 /100 WBC (ref 0–0.2)
PLATELET # BLD AUTO: 318 10*3/MM3 (ref 140–450)
PMV BLD AUTO: 10 FL (ref 6–12)
POTASSIUM SERPL-SCNC: 3.9 MMOL/L (ref 3.5–5.2)
PROT SERPL-MCNC: 7.3 G/DL (ref 6–8.5)
PROTHROMBIN TIME: 14.2 SECONDS (ref 11.8–14.9)
RBC # BLD AUTO: 4.33 10*6/MM3 (ref 4.14–5.8)
SODIUM SERPL-SCNC: 140 MMOL/L (ref 136–145)
TRIGL SERPL-MCNC: 111 MG/DL (ref 0–150)
TSH SERPL DL<=0.05 MIU/L-ACNC: 2.54 UIU/ML (ref 0.27–4.2)
VLDLC SERPL-MCNC: 20 MG/DL (ref 5–40)
WBC NRBC COR # BLD AUTO: 2.94 10*3/MM3 (ref 3.4–10.8)

## 2024-12-16 PROCEDURE — 85025 COMPLETE CBC W/AUTO DIFF WBC: CPT

## 2024-12-16 PROCEDURE — 85610 PROTHROMBIN TIME: CPT

## 2024-12-16 PROCEDURE — 36415 COLL VENOUS BLD VENIPUNCTURE: CPT

## 2024-12-16 PROCEDURE — 83036 HEMOGLOBIN GLYCOSYLATED A1C: CPT

## 2024-12-16 PROCEDURE — 80053 COMPREHEN METABOLIC PANEL: CPT

## 2024-12-16 PROCEDURE — 80061 LIPID PANEL: CPT

## 2024-12-16 PROCEDURE — 84443 ASSAY THYROID STIM HORMONE: CPT

## 2024-12-16 RX ORDER — SODIUM CHLORIDE 0.9 % (FLUSH) 0.9 %
10 SYRINGE (ML) INJECTION EVERY 12 HOURS SCHEDULED
Status: CANCELLED | OUTPATIENT
Start: 2024-12-16

## 2024-12-16 RX ORDER — SODIUM CHLORIDE 0.9 % (FLUSH) 0.9 %
10 SYRINGE (ML) INJECTION AS NEEDED
Status: CANCELLED | OUTPATIENT
Start: 2024-12-16

## 2024-12-16 RX ORDER — SODIUM CHLORIDE 9 MG/ML
40 INJECTION, SOLUTION INTRAVENOUS AS NEEDED
Status: CANCELLED | OUTPATIENT
Start: 2024-12-16

## 2024-12-18 ENCOUNTER — OFFICE VISIT (OUTPATIENT)
Dept: INTERNAL MEDICINE | Age: 69
End: 2024-12-18
Payer: MEDICARE

## 2024-12-18 VITALS
WEIGHT: 151 LBS | SYSTOLIC BLOOD PRESSURE: 110 MMHG | TEMPERATURE: 98.4 F | HEART RATE: 66 BPM | HEIGHT: 71 IN | DIASTOLIC BLOOD PRESSURE: 60 MMHG | BODY MASS INDEX: 21.14 KG/M2 | OXYGEN SATURATION: 95 %

## 2024-12-18 DIAGNOSIS — E78.2 MIXED HYPERLIPIDEMIA: ICD-10-CM

## 2024-12-18 DIAGNOSIS — I10 PRIMARY HYPERTENSION: ICD-10-CM

## 2024-12-18 DIAGNOSIS — E03.4 HYPOTHYROIDISM DUE TO ACQUIRED ATROPHY OF THYROID: Primary | ICD-10-CM

## 2024-12-18 DIAGNOSIS — D70.2 DRUG-INDUCED LEUKOPENIA: ICD-10-CM

## 2024-12-18 DIAGNOSIS — Z12.5 PROSTATE CANCER SCREENING: ICD-10-CM

## 2024-12-18 DIAGNOSIS — Z95.1 HX OF THREE VESSEL CORONARY ARTERY BYPASS: ICD-10-CM

## 2024-12-18 DIAGNOSIS — Z00.00 WELL ADULT EXAM: ICD-10-CM

## 2024-12-18 PROCEDURE — 3074F SYST BP LT 130 MM HG: CPT | Performed by: INTERNAL MEDICINE

## 2024-12-18 PROCEDURE — 99214 OFFICE O/P EST MOD 30 MIN: CPT | Performed by: INTERNAL MEDICINE

## 2024-12-18 PROCEDURE — 3078F DIAST BP <80 MM HG: CPT | Performed by: INTERNAL MEDICINE

## 2024-12-18 PROCEDURE — G2211 COMPLEX E/M VISIT ADD ON: HCPCS | Performed by: INTERNAL MEDICINE

## 2024-12-18 RX ORDER — ISOSORBIDE MONONITRATE 30 MG/1
30 TABLET, EXTENDED RELEASE ORAL DAILY
Status: ON HOLD | COMMUNITY
Start: 2024-12-10

## 2024-12-18 NOTE — ASSESSMENT & PLAN NOTE
LDL is up from 105 to 114 as of his 12/24 OV.  His weight is very stable in the 150 ballpark.  He is intolerant to statin so he is on Nexletol and Zetia.    He has been having some chest pain here recently and is actually scheduled for cardiac catheterization next week by Dr. Refugio Mejia.  Obviously if has significant blockages, he will be a candidate for   PCSK9 inhibitors.

## 2024-12-18 NOTE — ASSESSMENT & PLAN NOTE
Patient is 13 years out, but he had some early symptoms recently, underwent stress imaging study which was abnormal and he is scheduled for catheterization as noted next week.    He is maintained on low-dose isosorbide along with aspirin and metoprolol for antianginal benefit, continue same.

## 2024-12-18 NOTE — ASSESSMENT & PLAN NOTE
This is related to sulfasalazine, his WBC is stable at 2.9 with normal differential.  No issues with hemoglobin or platelets.

## 2024-12-18 NOTE — ASSESSMENT & PLAN NOTE
TSH is stable at 2.5 as of his 12/24 OV.  We lowered him from 100 to 88 mcg last year, over time this has normalized his TSH, so we will continue same.

## 2024-12-18 NOTE — PROGRESS NOTES
"Chief Complaint  Hypothyroidism, Follow-up (Pt had labs, he states that this is routine. ), and Chest Pain (Pt is having a heart cath on Monday 12/23. )    Subjective          Troy Vega presents to [facility]     Hyperlipidemia  Exacerbating diseases include hypothyroidism. Associated symptoms include chest pain. Pertinent negatives include no shortness of breath.   Hypothyroidism  Patient reports no depressed mood, fatigue or palpitations.   Chest Pain   Pertinent negatives include no abdominal pain, cough, fever, palpitations or shortness of breath.     History of present illness:  Patient is a pleasant 68-year-old male with underlying coronary artery disease prior bypass, Crohn's disease, hypertension and hyperlipidemia, who is here 6/24 for routine 6-month follow-up.  We will review his labs and make further recommendations at that time.    Review of Systems   Constitutional:  Negative for appetite change, fatigue and fever.   HENT:  Negative for congestion and ear pain.    Eyes:  Negative for blurred vision.   Respiratory:  Negative for cough, chest tightness, shortness of breath and wheezing.    Cardiovascular:  Positive for chest pain. Negative for palpitations and leg swelling.   Gastrointestinal:  Negative for abdominal pain.   Genitourinary:  Negative for difficulty urinating, dysuria and hematuria.   Musculoskeletal:  Negative for arthralgias and gait problem.   Skin:  Negative for skin lesions.   Neurological:  Negative for syncope, memory problem and confusion.   Psychiatric/Behavioral:  Negative for self-injury and depressed mood.        Objective   Vital Signs:   /60   Pulse 66   Temp 98.4 °F (36.9 °C) (Skin)   Ht 180.3 cm (70.98\")   Wt 68.5 kg (151 lb)   SpO2 95%   BMI 21.07 kg/m²           Physical Exam  Vitals and nursing note reviewed.   Constitutional:       General: He is not in acute distress.     Appearance: Normal appearance. He is not " toxic-appearing.   HENT:      Head: Atraumatic.      Right Ear: External ear normal.      Left Ear: External ear normal.      Nose: Nose normal.      Mouth/Throat:      Mouth: Mucous membranes are moist.   Eyes:      General:         Right eye: No discharge.         Left eye: No discharge.      Extraocular Movements: Extraocular movements intact.      Pupils: Pupils are equal, round, and reactive to light.   Cardiovascular:      Rate and Rhythm: Normal rate and regular rhythm.      Pulses: Normal pulses.      Heart sounds: Normal heart sounds. No murmur heard.     No gallop.   Pulmonary:      Effort: Pulmonary effort is normal. No respiratory distress.      Breath sounds: No wheezing, rhonchi or rales.   Abdominal:      General: There is no distension.      Palpations: Abdomen is soft. There is no mass.      Tenderness: There is no abdominal tenderness. There is no guarding.   Musculoskeletal:         General: No swelling or tenderness.      Cervical back: No tenderness.      Right lower leg: No edema.      Left lower leg: No edema.   Skin:     General: Skin is warm and dry.      Findings: No rash.   Neurological:      General: No focal deficit present.      Mental Status: He is alert and oriented to person, place, and time. Mental status is at baseline.      Motor: No weakness.      Gait: Gait normal.   Psychiatric:         Mood and Affect: Mood normal.         Thought Content: Thought content normal.          Result Review :   The following data was reviewed by: Rolando Lopez MD on 10/15/2021:                  Assessment and Plan    Diagnoses and all orders for this visit:    1. Hypothyroidism due to acquired atrophy of thyroid (Primary)  Assessment & Plan:  TSH is stable at 2.5 as of his 12/24 OV.  We lowered him from 100 to 88 mcg last year, over time this has normalized his TSH, so we will continue same.    Orders:  -     TSH; Future    2. Mixed hyperlipidemia  Assessment & Plan:   LDL is up from 105 to 114 as  of his 12/24 OV.  His weight is very stable in the 150 ballpark.  He is intolerant to statin so he is on Nexletol and Zetia.    He has been having some chest pain here recently and is actually scheduled for cardiac catheterization next week by Dr. Refugio Mejia.  Obviously if has significant blockages, he will be a candidate for   PCSK9 inhibitors.    Orders:  -     Lipid panel; Future    3. Primary hypertension  Assessment & Plan:  Blood pressure is well-controlled and his pulse is in the mid 60s as of his 12/24 OV.  He is maintained on just low-dose metoprolol for his underlying CAD, stable to continue same.    Orders:  -     CBC w AUTO Differential; Future    4. Hx of three vessel coronary artery bypass  Overview:  CAD/CABG 3V (7/11) and sees Dr Brownlee q 6-12 mo.    Assessment & Plan:  Patient is 13 years out, but he had some early symptoms recently, underwent stress imaging study which was abnormal and he is scheduled for catheterization as noted next week.    He is maintained on low-dose isosorbide along with aspirin and metoprolol for antianginal benefit, continue same.    Orders:  -     Comprehensive metabolic panel; Future    5. Drug-induced leukopenia  Assessment & Plan:  This is related to sulfasalazine, his WBC is stable at 2.9 with normal differential.  No issues with hemoglobin or platelets.      6. Prostate cancer screening  -     PSA Screen; Future    7. Well adult exam  -     Hemoglobin A1c; Future           --  --  OLDER NOTES:  ANNUAL PHYSICAL 4/21; d/w all labs; no ischemic sxs; no changes PFSH.  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    LIPIDS at goal with LDL of 74...82 with no changes yet...142 b/c taking PRN due to leg pain, so change to another... and is tolerating  lipitor, so will titrate and blame the R leg pain on his back and eval when he desires...82, but will try 80 mg now...68, but , so need to drop back down and add zetia when LFT's normalize...on 40 Lipitor with qod Zetia=LDL 73 and ALT only 40, so go to qd on Zetia, but no more than 40 of lipitor...69 is best gonna get; LFT's neg 7/18 OV...81 is ok for now...120 in 1/20 b/c sick in hosp past month; no changes needed...74 is goal... 59---> 80 in 4/21, so increase on RTO if same.  --  HTN remains well controlled=ditto 10/20.  --  HYPOTHYROIDISM...increase again 8/17...0.3 despite back on lower dose=112, so stay on it... stable on this dose 1/19...TSH 0.16, so rec try 100 in 7/19...was fine in 11/19, but 23 at 1/20 OV after a month in hosp; I d/w finish up the 112 he has on hand and then back to 100---> up 4/21, but no c/o, so defer RTO.  --  PORTAL VEIN THROMBOSIS occurred after the stenosis below, so it has been over 6 mo as of 10/20 OV and he has not had a restenosis after temp stents removed, so I agree with stopping Eliquis at this time---> no sxs this regard 4/21.  --  PSC CIRRHOSIS=dx'd 10/18 after elevated LFT's noted during eval of upper back pain with jaundice; s/p failed cannulation of CBD 12/21/18...admitted [facility] 12/19 with biliary stricture and had failed ERCP again; required percutaneous drain; concern for chloangiocarcinoma noted; was d/c on the 13 and readmitted on 12/26 and required stents; then 3rd admit for portal vein thrombosis; has f/u with Dr Pena...they need to put in permanent stents sometime, but pt wants to wait as of 4/20 due to virus...stents out 5/20 with stable labs...ditto 10/20 and they didn't call him back and that's ok since LFTs wnl---> AFP/lipase/LFT's neg 4/21.    ULCERATIVE COLITIS since '82 on below and with no recent flare...says no issues 7/19...c-scope 9/19 was neg---> no bowel issues 4/21.  LEUKOPENIA secondary to sulfasalazine...remains stable at 3.6...4.1 in 4/20  with nl diff...3.1 is ok in 10/20---> 3.2 in 4/21 holding.  --  VIT D DEF wnl at 37...later still.  R ULNAR ENTRAPMENT 2/18 = tender, so will try medrol; to ortho if no better... better after tx per chiropractor, so defer as of 7/18 OV.  --  PTOSIS...this is not MG, eval per optho for surgery...here for pre-op clearance...10/3...no CP/SOB, c/w meds, labs noted...s/p 10/13.  HOARSENESS d/w me at 7/18 OV; no concerning meds, no sprays OTC, no A.R., no GERD; will ask ENT to eval for polyps.  --  --  PSA 3.3 (6/13/2024)  COLON 7/23 = wnl = annually per Dr Naranjo's recs.    (, 2 kids=son here and girl in [facility]= still working with [employer] as of 12/23 OV).    Follow Up   No follow-ups on file.  Patient was given instructions and counseling regarding his condition or for health maintenance advice. Please see specific information pulled into the AVS if appropriate.

## 2024-12-18 NOTE — ASSESSMENT & PLAN NOTE
Blood pressure is well-controlled and his pulse is in the mid 60s as of his 12/24 OV.  He is maintained on just low-dose metoprolol for his underlying CAD, stable to continue same.

## 2024-12-18 NOTE — ASSESSMENT & PLAN NOTE
Patient had C-scope in 7/23, it was normal, but he is aware to reach out to Dr. Steiner to make arrangements for his annual scan.

## 2024-12-23 ENCOUNTER — HOSPITAL ENCOUNTER (OUTPATIENT)
Facility: HOSPITAL | Age: 69
Discharge: HOME | End: 2024-12-24
Attending: SPECIALIST | Admitting: SPECIALIST
Payer: MEDICARE

## 2024-12-23 DIAGNOSIS — R94.39 ABNORMAL STRESS TEST: ICD-10-CM

## 2024-12-23 PROBLEM — I25.10 CAD S/P PERCUTANEOUS CORONARY ANGIOPLASTY: Status: ACTIVE | Noted: 2024-12-23

## 2024-12-23 PROBLEM — Z98.61 CAD S/P PERCUTANEOUS CORONARY ANGIOPLASTY: Status: ACTIVE | Noted: 2024-12-23

## 2024-12-23 LAB
ACT BLD: 464 SECONDS (ref 89–137)
ANION GAP SERPL CALCULATED.3IONS-SCNC: 12 MMOL/L (ref 5–15)
BASOPHILS # BLD AUTO: 0.04 10*3/MM3 (ref 0–0.2)
BASOPHILS NFR BLD AUTO: 1.3 % (ref 0–1.5)
BUN SERPL-MCNC: 13 MG/DL (ref 8–23)
BUN/CREAT SERPL: 13.7 (ref 7–25)
CALCIUM SPEC-SCNC: 9.3 MG/DL (ref 8.6–10.5)
CHLORIDE SERPL-SCNC: 101 MMOL/L (ref 98–107)
CO2 SERPL-SCNC: 26 MMOL/L (ref 22–29)
CREAT SERPL-MCNC: 0.95 MG/DL (ref 0.76–1.27)
DEPRECATED RDW RBC AUTO: 42.7 FL (ref 37–54)
EGFRCR SERPLBLD CKD-EPI 2021: 87.2 ML/MIN/1.73
EOSINOPHIL # BLD AUTO: 0.01 10*3/MM3 (ref 0–0.4)
EOSINOPHIL NFR BLD AUTO: 0.3 % (ref 0.3–6.2)
ERYTHROCYTE [DISTWIDTH] IN BLOOD BY AUTOMATED COUNT: 12.3 % (ref 12.3–15.4)
GLUCOSE SERPL-MCNC: 96 MG/DL (ref 65–99)
HCT VFR BLD AUTO: 40.9 % (ref 37.5–51)
HGB BLD-MCNC: 13.1 G/DL (ref 13–17.7)
IMM GRANULOCYTES # BLD AUTO: 0 10*3/MM3 (ref 0–0.05)
IMM GRANULOCYTES NFR BLD AUTO: 0 % (ref 0–0.5)
INR PPP: 1.17 (ref 0.86–1.15)
LYMPHOCYTES # BLD AUTO: 1.34 10*3/MM3 (ref 0.7–3.1)
LYMPHOCYTES NFR BLD AUTO: 42.3 % (ref 19.6–45.3)
MCH RBC QN AUTO: 30.7 PG (ref 26.6–33)
MCHC RBC AUTO-ENTMCNC: 32 G/DL (ref 31.5–35.7)
MCV RBC AUTO: 95.8 FL (ref 79–97)
MONOCYTES # BLD AUTO: 0.35 10*3/MM3 (ref 0.1–0.9)
MONOCYTES NFR BLD AUTO: 11 % (ref 5–12)
NEUTROPHILS NFR BLD AUTO: 1.43 10*3/MM3 (ref 1.7–7)
NEUTROPHILS NFR BLD AUTO: 45.1 % (ref 42.7–76)
NRBC BLD AUTO-RTO: 0 /100 WBC (ref 0–0.2)
PLATELET # BLD AUTO: 254 10*3/MM3 (ref 140–450)
PMV BLD AUTO: 9.5 FL (ref 6–12)
POTASSIUM SERPL-SCNC: 4.2 MMOL/L (ref 3.5–5.2)
PROTHROMBIN TIME: 15.2 SECONDS (ref 11.8–14.9)
RBC # BLD AUTO: 4.27 10*6/MM3 (ref 4.14–5.8)
SODIUM SERPL-SCNC: 139 MMOL/L (ref 136–145)
WBC NRBC COR # BLD AUTO: 3.17 10*3/MM3 (ref 3.4–10.8)

## 2024-12-23 PROCEDURE — C1769 GUIDE WIRE: HCPCS | Performed by: INTERNAL MEDICINE

## 2024-12-23 PROCEDURE — 99222 1ST HOSP IP/OBS MODERATE 55: CPT | Performed by: INTERNAL MEDICINE

## 2024-12-23 PROCEDURE — 94761 N-INVAS EAR/PLS OXIMETRY MLT: CPT

## 2024-12-23 PROCEDURE — 25510000001 IOPAMIDOL PER 1 ML: Performed by: INTERNAL MEDICINE

## 2024-12-23 PROCEDURE — C1874 STENT, COATED/COV W/DEL SYS: HCPCS | Performed by: INTERNAL MEDICINE

## 2024-12-23 PROCEDURE — C1725 CATH, TRANSLUMIN NON-LASER: HCPCS | Performed by: INTERNAL MEDICINE

## 2024-12-23 PROCEDURE — 80048 BASIC METABOLIC PNL TOTAL CA: CPT | Performed by: SPECIALIST

## 2024-12-23 PROCEDURE — 93454 CORONARY ARTERY ANGIO S&I: CPT | Performed by: INTERNAL MEDICINE

## 2024-12-23 PROCEDURE — C9600 PERC DRUG-EL COR STENT SING: HCPCS | Performed by: INTERNAL MEDICINE

## 2024-12-23 PROCEDURE — C1887 CATHETER, GUIDING: HCPCS | Performed by: INTERNAL MEDICINE

## 2024-12-23 PROCEDURE — 25010000002 FENTANYL CITRATE (PF) 50 MCG/ML SOLUTION: Performed by: INTERNAL MEDICINE

## 2024-12-23 PROCEDURE — C1760 CLOSURE DEV, VASC: HCPCS | Performed by: INTERNAL MEDICINE

## 2024-12-23 PROCEDURE — 92928 PRQ TCAT PLMT NTRAC ST 1 LES: CPT | Performed by: INTERNAL MEDICINE

## 2024-12-23 PROCEDURE — 85347 COAGULATION TIME ACTIVATED: CPT

## 2024-12-23 PROCEDURE — 92978 ENDOLUMINL IVUS OCT C 1ST: CPT | Performed by: INTERNAL MEDICINE

## 2024-12-23 PROCEDURE — 94799 UNLISTED PULMONARY SVC/PX: CPT

## 2024-12-23 PROCEDURE — C1894 INTRO/SHEATH, NON-LASER: HCPCS | Performed by: INTERNAL MEDICINE

## 2024-12-23 PROCEDURE — 25010000002 LIDOCAINE 2% SOLUTION: Performed by: INTERNAL MEDICINE

## 2024-12-23 PROCEDURE — 25010000002 MIDAZOLAM PER 1MG: Performed by: INTERNAL MEDICINE

## 2024-12-23 PROCEDURE — C1753 CATH, INTRAVAS ULTRASOUND: HCPCS | Performed by: INTERNAL MEDICINE

## 2024-12-23 PROCEDURE — 25010000002 BIVALIRUDIN TRIFLUOROACETATE 250 MG RECONSTITUTED SOLUTION 1 EACH VIAL: Performed by: INTERNAL MEDICINE

## 2024-12-23 PROCEDURE — 93455 CORONARY ART/GRFT ANGIO S&I: CPT | Performed by: INTERNAL MEDICINE

## 2024-12-23 PROCEDURE — 85025 COMPLETE CBC W/AUTO DIFF WBC: CPT | Performed by: SPECIALIST

## 2024-12-23 PROCEDURE — 85610 PROTHROMBIN TIME: CPT | Performed by: SPECIALIST

## 2024-12-23 DEVICE — XIENCE SKYPOINT™ EVEROLIMUS ELUTING CORONARY STENT SYSTEM 3.00 MM X 12 MM / RAPID-EXCHANGE
Type: IMPLANTABLE DEVICE | Status: FUNCTIONAL
Brand: XIENCE SKYPOINT™

## 2024-12-23 DEVICE — XIENCE SKYPOINT™ EVEROLIMUS ELUTING CORONARY STENT SYSTEM 3.00 MM X 38 MM / RAPID-EXCHANGE
Type: IMPLANTABLE DEVICE | Status: FUNCTIONAL
Brand: XIENCE SKYPOINT™

## 2024-12-23 RX ORDER — LIDOCAINE HYDROCHLORIDE 20 MG/ML
INJECTION, SOLUTION INFILTRATION; PERINEURAL
Status: DISCONTINUED | OUTPATIENT
Start: 2024-12-23 | End: 2024-12-23 | Stop reason: HOSPADM

## 2024-12-23 RX ORDER — SODIUM CHLORIDE 0.9 % (FLUSH) 0.9 %
10 SYRINGE (ML) INJECTION EVERY 12 HOURS SCHEDULED
Status: DISCONTINUED | OUTPATIENT
Start: 2024-12-23 | End: 2024-12-23 | Stop reason: HOSPADM

## 2024-12-23 RX ORDER — SODIUM CHLORIDE 0.9 % (FLUSH) 0.9 %
10 SYRINGE (ML) INJECTION AS NEEDED
Status: DISCONTINUED | OUTPATIENT
Start: 2024-12-23 | End: 2024-12-23 | Stop reason: HOSPADM

## 2024-12-23 RX ORDER — IOPAMIDOL 755 MG/ML
INJECTION, SOLUTION INTRAVASCULAR
Status: DISCONTINUED | OUTPATIENT
Start: 2024-12-23 | End: 2024-12-23 | Stop reason: HOSPADM

## 2024-12-23 RX ORDER — SODIUM CHLORIDE 9 MG/ML
40 INJECTION, SOLUTION INTRAVENOUS AS NEEDED
Status: DISCONTINUED | OUTPATIENT
Start: 2024-12-23 | End: 2024-12-23 | Stop reason: HOSPADM

## 2024-12-23 RX ORDER — MIDAZOLAM HYDROCHLORIDE 2 MG/2ML
INJECTION, SOLUTION INTRAMUSCULAR; INTRAVENOUS
Status: DISCONTINUED | OUTPATIENT
Start: 2024-12-23 | End: 2024-12-23 | Stop reason: HOSPADM

## 2024-12-23 RX ORDER — ACETAMINOPHEN 325 MG/1
650 TABLET ORAL EVERY 4 HOURS PRN
Status: DISCONTINUED | OUTPATIENT
Start: 2024-12-23 | End: 2024-12-24 | Stop reason: HOSPADM

## 2024-12-23 RX ORDER — FENTANYL CITRATE 50 UG/ML
INJECTION, SOLUTION INTRAMUSCULAR; INTRAVENOUS
Status: DISCONTINUED | OUTPATIENT
Start: 2024-12-23 | End: 2024-12-23 | Stop reason: HOSPADM

## 2024-12-23 RX ORDER — SULFASALAZINE 500 MG/1
1000 TABLET ORAL EVERY 12 HOURS SCHEDULED
Status: DISCONTINUED | OUTPATIENT
Start: 2024-12-23 | End: 2024-12-24 | Stop reason: HOSPADM

## 2024-12-23 RX ORDER — METOPROLOL SUCCINATE 25 MG/1
25 TABLET, EXTENDED RELEASE ORAL DAILY
Status: DISCONTINUED | OUTPATIENT
Start: 2024-12-23 | End: 2024-12-24 | Stop reason: HOSPADM

## 2024-12-23 RX ORDER — ISOSORBIDE MONONITRATE 30 MG/1
60 TABLET, EXTENDED RELEASE ORAL
Status: DISCONTINUED | OUTPATIENT
Start: 2024-12-23 | End: 2024-12-24 | Stop reason: HOSPADM

## 2024-12-23 RX ORDER — ASPIRIN 81 MG/1
TABLET, CHEWABLE ORAL
Status: DISCONTINUED | OUTPATIENT
Start: 2024-12-23 | End: 2024-12-23 | Stop reason: HOSPADM

## 2024-12-23 RX ORDER — ASPIRIN 81 MG/1
81 TABLET ORAL DAILY
Status: DISCONTINUED | OUTPATIENT
Start: 2024-12-23 | End: 2024-12-24 | Stop reason: HOSPADM

## 2024-12-23 RX ORDER — NITROGLYCERIN 0.4 MG/1
0.4 TABLET SUBLINGUAL
Status: DISCONTINUED | OUTPATIENT
Start: 2024-12-23 | End: 2024-12-24 | Stop reason: HOSPADM

## 2024-12-23 RX ADMIN — ISOSORBIDE MONONITRATE 60 MG: 30 TABLET, EXTENDED RELEASE ORAL at 11:33

## 2024-12-23 RX ADMIN — NITROGLYCERIN 0.4 MG: 0.4 TABLET SUBLINGUAL at 10:19

## 2024-12-23 RX ADMIN — TICAGRELOR 90 MG: 90 TABLET ORAL at 21:26

## 2024-12-23 RX ADMIN — SULFASALAZINE 1000 MG: 500 TABLET ORAL at 21:26

## 2024-12-23 NOTE — H&P
Date of service: 2024    Reason for admission: Chest pain and cardiac catheterization for abnormal stress test.    HPI: A 69-year-old male initially presented with retrosternal, exertion, burning type chest pain.  This has been ongoing for the last few months.  He had no dyspnea, orthopnea, palpitation, syncope or near syncope.  On December 10, 2024, he underwent treadmill stress test that was significantly abnormal.  It was limited by fatigue, chest pain and ischemic ECG changes.    The test showed 1.0 to 1.9 mm horizontal ST depression as well as T wave inversion in V4 through V6.  The ST depression started within 3 to 4 minutes of exercising.  The patient complained of retrosternal chest burning type chest pain during the stress.  It resolved after terminating the test and taking sublingual nitroglycerin x 2.  During the recovery time, the aforementioned changes resolved as well as the ischemic ECG changes at 6 minutes and 27 seconds.  There were no arrhythmia.    Review of systems: Negative for headache, tinnitus, vertigo, nausea, vomiting, abdominal pain, dysuria, hematuria, hemoptysis, TIA or CVA-like symptoms    Past medical surgical history:    1.  CAD, status post CABG x 3 in  (LIMA to LAD and D1 and SUZANNE to RCA).  2.  Hypercholesterolemia  3.  Hypothyroidism  4.  Ulcerative colitis  5.  Other surgeries-procedures: EGD, appendectomy, colonoscopy and pancreatic stenting.    Medications: See the patient's medication list    Allergies: Statins and oxycodone.    Social history: He never smoked.  No alcohol or illicit drug use.  Retired .    Family history: His father had CABG and  of postop CVA in his late 70s or 80s.  His mother had CABG in her 80s.  Father and mother had carotid endarterectomy.    Physical examinations: He was in no pain or respiratory distress  Vital signs: Reviewed  HEENT: Sclerae: Nonicteric.  EOMI  Neck: No JVD or carotid bruit  Chest: CTA  Cardiac: RRR.  No  murmurs or S3 gallop  Abdomen: Soft and nontender.  No megalies or masses.  Bowel sounds present.  Extremities: No edema, cyanosis or clubbing.  Pulses: Intact  Neuro: Alert, oriented x 3 no facial droop and speech was clear    Records: Reviewed    Assessment and plan:    1.  Chronic stable angina.  2.  Significantly abnormal treadmill stress test (as stated above).  3.  CAD status post CABG x 3  4.  Hypercholesterolemia  5.  Will proceed with cardiac catheterization to define the coronary anatomy.  The benefits and risks of the procedure were explained to the patient.  Informed consent.  Any further management will be based on the test results.  Discussed the case with Dr. Moses who will proceed with cardiac catheterization and coronary intervention, if needed.

## 2024-12-23 NOTE — PLAN OF CARE
Goal Outcome Evaluation:         Progress  no change  Outcome Evaluation  Patient received from Cath Lab post right femoral cath and stent placement. Right femoral angioseal in place. Gauze and tegaderm to right femoral site with small amount of blood on dressing, marked with marker. No further progression in bloody drainage. Chest pain upon arrival to the floor. Pt treated with 1 SL Nitro with relief. VSS.

## 2024-12-23 NOTE — Clinical Note
First balloon inflation max pressure = 14 anahi. First balloon inflation duration = 13 seconds. Second inflation of balloon - Max pressure = 16 anahi. 2nd Inflation of balloon - Duration = 12 seconds. Third inflation of balloon - Max pressure = 18 anahi. 3rd Inflation of balloon - Duration = 13 seconds.

## 2024-12-23 NOTE — Clinical Note
First balloon inflation max pressure = 18 anahi. First balloon inflation duration = 12 seconds. Second inflation of balloon - Max pressure = 16 anahi. 2nd Inflation of balloon - Duration = 13 seconds. Third inflation of balloon - Max pressure = 18 anahi. 3rd Inflation of balloon - Duration = 11 seconds.

## 2024-12-23 NOTE — Clinical Note
First balloon inflation max pressure = 12 anahi. First balloon inflation duration = 7 seconds. Second inflation of balloon - Max pressure = 12 anahi. 2nd Inflation of balloon - Duration = 8 seconds.

## 2024-12-23 NOTE — Clinical Note
ACT = 464 (sec). ACT was drawn at 09:23 EST. ACT result was completed at 09:34 EST. Call received from Sandra Solano, [facility] NP. Verbal update provided. Sandra relayed that Nicole José jessie#[phone] is the patient's . Ceci #[phone] is the patient's nurse. Their fax is #[phone]. They requested patient's prescriptions be sent directly to [facility] upon d/c to ensure they are filled fx#[phone]. Writer expressed understanding, but shared that per OT evaluations ROSITA is being recommended and writer would assist with solidifying d/c details as patient progresses. PT evaluations pending. Sandra expressed understanding.    SW continues to follow.

## 2024-12-23 NOTE — Clinical Note
First balloon inflation max pressure = 12 anahi. First balloon inflation duration = 9 seconds. Second inflation of balloon - Max pressure = 12 anahi. 2nd Inflation of balloon - Duration = 11 seconds. Third inflation of balloon - Max pressure = 12 anahi. 3rd Inflation of balloon - Duration = 8 seconds. Fourth inflation of balloon - Max pressure = 12 anahi. 4th Inflation of balloon - Duration = 9 seconds.

## 2024-12-23 NOTE — Clinical Note
First balloon inflation max pressure = 12 anahi. First balloon inflation duration = 9 seconds. Second inflation of balloon - Max pressure = 16 anahi. 2nd Inflation of balloon - Duration = 12 seconds.

## 2024-12-23 NOTE — H&P
CONSULTATION NOTE CARDIOLOGY        Patient Care Team:  Rolando Lopez MD as PCP - General (Internal Medicine)  Humera Brownlee MD as Consulting Physician (Cardiology)  Keny Back MD as Consulting Physician (Orthopedic Surgery)    Chief complaint Chest Pian    Subjective     History of Present Illness  The patient has known severe 3 vessel CAD s/p 3 vessel CABG in 2012. He has past medical history of hyperlipidemia and hypertension. The patient reports intermittent episodes of chest pain. Recent Nuclear Stress was abnormal with evidence of ischemia. He is now referred for coronary angiography and possible revascularization.     Review of Systems   All systems were reviewed and negative except for chest pain.      Past Medical History:   Diagnosis Date    Abnormal finding on radiology exam     Atherosclerosis of coronary artery bypass graft(s) without angina pectoris     Atrophy of thyroid (acquired)     Decreased white blood cell count     Elevated CK     Hypercholesterolemia     Hypothyroidism     Leukopenia     Mixed hyperlipidemia     Other decreased white blood cell count     Other fatigue     Primary sclerosing cholangitis     Ulcerative (chronic) pancolitis without complications     Ulcerative colitis, unspecified, without complications      Past Surgical History:   Procedure Laterality Date    APPENDECTOMY  1980    CARDIAC CATHETERIZATION      12/2019 Cath placement (2)    COLONOSCOPY      COLONOSCOPY N/A 7/26/2023    Procedure: COLONOSCOPY WITH BIOPSIES;  Surgeon: Gabino Naranjo MD;  Location: [facility] ENDOSCOPY;  Service: Gastroenterology;  Laterality: N/A;  NORMAL COLONOSCOPY    CORONARY ARTERY BYPASS GRAFT      CABG 7/2011 3V    PANCREAS SURGERY      12/2018 Stent in pancreas    UPPER GASTROINTESTINAL ENDOSCOPY       Family History   Problem Relation Age of Onset    Colon cancer Neg Hx     Malig Hyperthermia Neg Hx      Social History     Tobacco Use    Smoking status: Never     Passive  exposure: Never    Smokeless tobacco: Never   Vaping Use    Vaping status: Never Used   Substance Use Topics    Alcohol use: Never    Drug use: Never     E-cigarette/Vaping    E-cigarette/Vaping Use Never User     Passive Exposure No     Counseling Given Yes      E-cigarette/Vaping Substances    Nicotine No     THC No     CBD No     Flavoring No      Medications Prior to Admission   Medication Sig Dispense Refill Last Dose/Taking    aspirin 81 MG EC tablet Take 1 tablet by mouth Daily.   12/22/2024 Morning    ezetimibe (ZETIA) 10 MG tablet Take 1 tablet by mouth Daily. 90 tablet 1 12/22/2024 Evening    folic acid (FOLVITE) 1 MG tablet TAKE 1 TABLET BY MOUTH DAILY 90 tablet 1 12/22/2024 Morning    isosorbide mononitrate (IMDUR) 30 MG 24 hr tablet    12/22/2024    levothyroxine (SYNTHROID, LEVOTHROID) 88 MCG tablet TAKE 1 TABLET BY MOUTH DAILY 90 tablet 1 12/22/2024 Morning    metoprolol succinate XL (TOPROL-XL) 25 MG 24 hr tablet Take 1 tablet by mouth Daily.   12/22/2024 Morning    Multiple Vitamin (multivitamin) capsule Take 1 capsule by mouth Daily.   12/22/2024 Morning    Nexletol 180 MG tablet Take 1 tablet by mouth Daily.   12/22/2024 Evening    sulfaSALAzine (AZULFIDINE) 500 MG tablet TAKE TWO TABLETS BY MOUTH TWICE A  tablet 3 12/22/2024    nitroglycerin (NITROSTAT) 0.4 MG SL tablet         Allergies:  Levofloxacin, Statins, Oxycodone, and Oxycodone hcl        Objective      Vital Signs  Temp:  [97.3 °F (36.3 °C)] 97.3 °F (36.3 °C)  Heart Rate:  [60] 60  Resp:  [16] 16  BP: (165)/(76) 165/76    Physical Exam  Alert, oriented  Neck: No JVD  Heart. Regular, no gallops, no murmurs.   Lungs; No rales, no wheezing  Abdomen: bs +  LE: no edema  Neurologic. No motor deficits.   Results Review:    I reviewed the patient's new clinical results.  Discussed with the patient       Assessment & Plan       Abnormal stress test    CAD S/P percutaneous coronary angioplasty  Severe 3 Vessel CAD s/p ABG    Assessment &  Plan  I would continue with optimal medical therapy and proceed with left heart catheterization and possible revascularization. The risk and benefits of the procedure were discussed with the patient including change of myocardial infarction, stroke, vascular complications and contrast induced nephropathy among others, and the patient agreed. Will proceed with femoral access,the patient apparently had bilateral JANY grafts. Will consider revascularization if indicated.   I discussed the patients findings and my recommendations with patient and referring provider.     Elmer Moses MD  12/23/24  09:56 EST    Time:  35 minutes

## 2024-12-23 NOTE — Clinical Note
First balloon inflation max pressure = 20 anahi. First balloon inflation duration = 12 seconds. Second inflation of balloon - Max pressure = 18 anahi. 2nd Inflation of balloon - Duration = 10 seconds. Third inflation of balloon - Max pressure = 20 anahi. 3rd Inflation of balloon - Duration = 10 seconds.

## 2024-12-23 NOTE — Clinical Note
A 4 fr sheath was successfully inserted using micropuncture technique into the right femoral artery.

## 2024-12-24 VITALS
WEIGHT: 157.63 LBS | SYSTOLIC BLOOD PRESSURE: 125 MMHG | OXYGEN SATURATION: 96 % | HEART RATE: 67 BPM | HEIGHT: 71 IN | TEMPERATURE: 97.7 F | BODY MASS INDEX: 22.07 KG/M2 | RESPIRATION RATE: 16 BRPM | DIASTOLIC BLOOD PRESSURE: 62 MMHG

## 2024-12-24 LAB
ANION GAP SERPL CALCULATED.3IONS-SCNC: 9.3 MMOL/L (ref 5–15)
BUN SERPL-MCNC: 15 MG/DL (ref 8–23)
BUN/CREAT SERPL: 14.9 (ref 7–25)
CALCIUM SPEC-SCNC: 9.1 MG/DL (ref 8.6–10.5)
CHLORIDE SERPL-SCNC: 102 MMOL/L (ref 98–107)
CHOLEST SERPL-MCNC: 151 MG/DL (ref 0–200)
CO2 SERPL-SCNC: 25.7 MMOL/L (ref 22–29)
CREAT SERPL-MCNC: 1.01 MG/DL (ref 0.76–1.27)
DEPRECATED RDW RBC AUTO: 43.1 FL (ref 37–54)
EGFRCR SERPLBLD CKD-EPI 2021: 81 ML/MIN/1.73
ERYTHROCYTE [DISTWIDTH] IN BLOOD BY AUTOMATED COUNT: 12.5 % (ref 12.3–15.4)
GLUCOSE SERPL-MCNC: 109 MG/DL (ref 65–99)
HBA1C MFR BLD: 4.9 % (ref 4.8–5.6)
HCT VFR BLD AUTO: 36.3 % (ref 37.5–51)
HDLC SERPL-MCNC: 37 MG/DL (ref 40–60)
HGB BLD-MCNC: 11.9 G/DL (ref 13–17.7)
LDLC SERPL CALC-MCNC: 93 MG/DL (ref 0–100)
LDLC/HDLC SERPL: 2.46 {RATIO}
MCH RBC QN AUTO: 30.7 PG (ref 26.6–33)
MCHC RBC AUTO-ENTMCNC: 32.8 G/DL (ref 31.5–35.7)
MCV RBC AUTO: 93.6 FL (ref 79–97)
PLATELET # BLD AUTO: 241 10*3/MM3 (ref 140–450)
PMV BLD AUTO: 9.5 FL (ref 6–12)
POTASSIUM SERPL-SCNC: 4.2 MMOL/L (ref 3.5–5.2)
RBC # BLD AUTO: 3.88 10*6/MM3 (ref 4.14–5.8)
SODIUM SERPL-SCNC: 137 MMOL/L (ref 136–145)
TRIGL SERPL-MCNC: 114 MG/DL (ref 0–150)
VLDLC SERPL-MCNC: 21 MG/DL (ref 5–40)
WBC NRBC COR # BLD AUTO: 6.58 10*3/MM3 (ref 3.4–10.8)

## 2024-12-24 PROCEDURE — 94761 N-INVAS EAR/PLS OXIMETRY MLT: CPT

## 2024-12-24 PROCEDURE — 85027 COMPLETE CBC AUTOMATED: CPT | Performed by: INTERNAL MEDICINE

## 2024-12-24 PROCEDURE — 80048 BASIC METABOLIC PNL TOTAL CA: CPT | Performed by: INTERNAL MEDICINE

## 2024-12-24 PROCEDURE — 94799 UNLISTED PULMONARY SVC/PX: CPT

## 2024-12-24 PROCEDURE — 83036 HEMOGLOBIN GLYCOSYLATED A1C: CPT | Performed by: INTERNAL MEDICINE

## 2024-12-24 PROCEDURE — 93005 ELECTROCARDIOGRAM TRACING: CPT | Performed by: INTERNAL MEDICINE

## 2024-12-24 PROCEDURE — 80061 LIPID PANEL: CPT | Performed by: INTERNAL MEDICINE

## 2024-12-24 RX ADMIN — METOPROLOL SUCCINATE 25 MG: 25 TABLET, FILM COATED, EXTENDED RELEASE ORAL at 08:39

## 2024-12-24 RX ADMIN — ISOSORBIDE MONONITRATE 60 MG: 30 TABLET, EXTENDED RELEASE ORAL at 08:38

## 2024-12-24 RX ADMIN — SULFASALAZINE 1000 MG: 500 TABLET ORAL at 08:38

## 2024-12-24 RX ADMIN — TICAGRELOR 90 MG: 90 TABLET ORAL at 08:38

## 2024-12-24 RX ADMIN — ASPIRIN 81 MG: 81 TABLET, COATED ORAL at 08:39

## 2024-12-24 NOTE — PLAN OF CARE
Goal Outcome Evaluation:              Outcome Evaluation: Pt denied and CP this shift. Only a tiny bit more of blood beyond what day shift had ruy. Pt up ambulating to bathroom with only standby and sometimes ad samuel. NAD noted. Will continue with POC

## 2024-12-24 NOTE — DISCHARGE SUMMARY
Date of admission: 12/23/2024    Date of discharge: 12/24/2024    Discharge diagnoses:    1.  Chronic stable angina  2.  Abnormal treadmill stress test  3.  CAD status post left circumflex stenting on 12/23/2024  4.  Status post CABG x 3 in 2011 (LIMA to LAD and D1 and SUZANNE to RCA).  5.  Hypercholesterolemia  6.  Hypothyroidism  7.  Ulcerative colitis    Discharge medications: See the patient's discharge medication list    Disposition: The patient will be discharged today to be followed up with Dr. Humera Mejia in 1 month.    Diet: Heart healthy    Activity: Ad samuel.    HPI: A 69-year-old male initially presented with retrosternal, exertion, burning type chest pain.  This has been ongoing for the last few months.  He had no dyspnea, orthopnea, palpitation, syncope or near syncope.  On December 10, 2024, he underwent treadmill stress test that was significantly abnormal.  It was limited by fatigue, chest pain and ischemic ECG changes.     The test showed 1.0 to 1.9 mm horizontal ST depression as well as T wave inversion in V4 through V6.  The ST depression started within 3 to 4 minutes of exercising.  The patient complained of retrosternal chest burning type chest pain during the stress.  It resolved after terminating the test and taking sublingual nitroglycerin x 2.  During the recovery time, the aforementioned changes resolved as well as the ischemic ECG changes at 6 minutes and 27 seconds.  There were no arrhythmia.    Physical examination on admission: He was in no pain or respiratory distress  Vital signs: Reviewed  HEENT: Sclerae: Nonicteric.  EOMI  Neck: No JVD or carotid bruit  Chest: CTA.  Cardiac: RRR.  No murmurs or S3 gallop  Abdomen: Soft and nontender.  No megalies or masses.  Bowel sounds present.  Extremities: No edema, cyanosis or clubbing.  Pulses: Intact  Neuro: Alert, oriented x 3 no facial droop and speech was clear.    Hospital course: The patient was admitted and preprocedure labs were  obtained.  Dr. Moses initially proceeded with a diagnostic cardiac catheterization.  Please see the full dictated cardiac catheterization report.  In summary, it demonstrated severe three-vessel coronary artery disease.  The LIMA to the LAD and SUZANNE to RCA were patent.  The left circumflex demonstrated proximal diffuse 60% stenosis.  The OM branch of the left circumflex demonstrated 90% proximal stenosis.  That lesion was stented.  There were no complications.  The patient was transferred from the Cath Lab to PCU.  He remained asymptomatic throughout his hospital stay.  The right groin was without hematoma or bleeding.      There was an 80% mid to distal stenosis of the LAD beyond the anastomotic segment of the LIMA to the LAD.  The patient will be seen as outpatient for further evaluation.  The LAD lesion will be stented, if the patient continues to be symptomatic and having anginal pain.  Follow-up will be in office in 1 month.

## 2024-12-24 NOTE — PLAN OF CARE
Goal Outcome Evaluation:              Outcome Evaluation: pt to dc home

## 2024-12-26 LAB
QT INTERVAL: 421 MS
QTC INTERVAL: 435 MS

## 2024-12-31 ENCOUNTER — OFFICE VISIT (OUTPATIENT)
Dept: INTERNAL MEDICINE | Age: 69
End: 2024-12-31
Payer: MEDICARE

## 2024-12-31 VITALS
WEIGHT: 152.2 LBS | DIASTOLIC BLOOD PRESSURE: 64 MMHG | HEIGHT: 71 IN | HEART RATE: 70 BPM | BODY MASS INDEX: 21.31 KG/M2 | OXYGEN SATURATION: 99 % | SYSTOLIC BLOOD PRESSURE: 122 MMHG | TEMPERATURE: 99.3 F

## 2024-12-31 DIAGNOSIS — D50.9 IRON DEFICIENCY ANEMIA, UNSPECIFIED IRON DEFICIENCY ANEMIA TYPE: ICD-10-CM

## 2024-12-31 DIAGNOSIS — I25.10 CAD S/P PERCUTANEOUS CORONARY ANGIOPLASTY: ICD-10-CM

## 2024-12-31 DIAGNOSIS — Z09 HOSPITAL DISCHARGE FOLLOW-UP: Primary | ICD-10-CM

## 2024-12-31 DIAGNOSIS — I10 PRIMARY HYPERTENSION: ICD-10-CM

## 2024-12-31 DIAGNOSIS — E78.2 MIXED HYPERLIPIDEMIA: ICD-10-CM

## 2024-12-31 DIAGNOSIS — K51.00 ULCERATIVE PANCOLITIS WITHOUT COMPLICATION: ICD-10-CM

## 2024-12-31 DIAGNOSIS — Z98.61 CAD S/P PERCUTANEOUS CORONARY ANGIOPLASTY: ICD-10-CM

## 2024-12-31 PROBLEM — R94.39 ABNORMAL STRESS TEST: Status: RESOLVED | Noted: 2024-12-11 | Resolved: 2024-12-31

## 2024-12-31 PROCEDURE — 3078F DIAST BP <80 MM HG: CPT | Performed by: INTERNAL MEDICINE

## 2024-12-31 PROCEDURE — G2211 COMPLEX E/M VISIT ADD ON: HCPCS | Performed by: INTERNAL MEDICINE

## 2024-12-31 PROCEDURE — 3074F SYST BP LT 130 MM HG: CPT | Performed by: INTERNAL MEDICINE

## 2024-12-31 PROCEDURE — 1159F MED LIST DOCD IN RCRD: CPT | Performed by: INTERNAL MEDICINE

## 2024-12-31 PROCEDURE — 99214 OFFICE O/P EST MOD 30 MIN: CPT | Performed by: INTERNAL MEDICINE

## 2024-12-31 PROCEDURE — 1160F RVW MEDS BY RX/DR IN RCRD: CPT | Performed by: INTERNAL MEDICINE

## 2024-12-31 NOTE — ASSESSMENT & PLAN NOTE
Blood pressure remains well-controlled and his pulse is in the mid 60s as of his 12/24 hospital follow-up.  He is maintained on just low-dose metoprolol for his underlying CAD, stable to continue same.

## 2024-12-31 NOTE — ASSESSMENT & PLAN NOTE
We reviewed the hospital notes, he had a stent to a 90% lesion in his circumflex, they are monitoring a 80% distal stenosis in his LAD that is downstream from his bypass graft.    He has not had any chest pain since discharge, he was started on Brilinta, he is taking this in addition to baby aspirin.  He is aware to avoid nonsteroidals other than Tylenol.    Patient was not sure whether to continue the isosorbide or not, I discussed with him given the untreated lesion in the LAD I suspect Dr. Refugio Mejia would want him to continue this, so he will get back on it until his follow-up with him at least in a couple of weeks.

## 2024-12-31 NOTE — ASSESSMENT & PLAN NOTE
Discussed with patient to reach out to us or Dr. Steiner if he has any blood in the stool, he will be at higher risk for this now that he is on Brilinta.    Additionally patient is not on any antiacids, no H2 blocker, no PPI.  He has not had any history of peptic ulcer disease.  Regardless given Brilinta and aspirin presently, will get a H. pylori stool antigen test to ensure he does not have this infection.

## 2024-12-31 NOTE — PROGRESS NOTES
Chief Complaint  Hospital Follow Up Visit (Hospital f/u from 12/23. /Pt states he is recovering well from having procedure for stints./Pt unsure if he should continue his IMDUR prescription.)    Subjective          Troy Vega presents to [facility]     Hyperlipidemia  Exacerbating diseases include hypothyroidism. Associated symptoms include chest pain. Pertinent negatives include no shortness of breath.   Hypothyroidism  Patient reports no depressed mood, fatigue or palpitations. His past medical history is significant for hyperlipidemia.   Chest Pain   Pertinent negatives include no abdominal pain, cough, fever, palpitations or shortness of breath.   His past medical history is significant for hyperlipidemia.     History of present illness:  Patient is a pleasant 68-year-old male with underlying coronary artery disease prior bypass, Crohn's disease, hypertension and hyperlipidemia, who is here 12/24 for routine 6-month follow-up.  We will review his labs and make further recommendations at that time.  ---> Patient being seen later 12/24 for hospital discharge follow-up:  Date admission: 12/23/2024  Date of discharge: 12/24/2024  1.  Chronic stable angina  2.  Abnormal treadmill stress test  3.  CAD status post left circumflex stenting on 12/23/2024  4.  Status post CABG x 3 in 2011 (LIMA to LAD and D1 and SUZANNE to RCA).    The LIMA to the LAD and SUZANNE to RCA were patent.  The left circumflex demonstrated proximal diffuse 60% stenosis.  The OM branch of the left circumflex demonstrated 90% proximal stenosis.  That lesion was stented.      There was an 80% mid to distal stenosis of the LAD beyond the anastomotic segment of the LIMA to the LAD.  The patient will be seen as outpatient for further evaluation.  The LAD lesion will be stented, if the patient continues to be symptomatic and having anginal pain.     Review of Systems   Constitutional:  Negative for appetite change,  "fatigue and fever.   HENT:  Negative for congestion and ear pain.    Eyes:  Negative for blurred vision.   Respiratory:  Negative for cough, chest tightness, shortness of breath and wheezing.    Cardiovascular:  Positive for chest pain. Negative for palpitations and leg swelling.   Gastrointestinal:  Negative for abdominal pain.   Genitourinary:  Negative for difficulty urinating, dysuria and hematuria.   Musculoskeletal:  Negative for arthralgias and gait problem.   Skin:  Negative for skin lesions.   Neurological:  Negative for syncope, memory problem and confusion.   Psychiatric/Behavioral:  Negative for self-injury and depressed mood.        Objective   Vital Signs:   /64 (BP Location: Right arm, Patient Position: Sitting, Cuff Size: Adult)   Pulse 70   Temp 99.3 °F (37.4 °C) (Skin)   Ht 180.3 cm (71\")   Wt 69 kg (152 lb 3.2 oz)   SpO2 99%   BMI 21.23 kg/m²           Physical Exam  Vitals and nursing note reviewed.   Constitutional:       General: He is not in acute distress.     Appearance: Normal appearance. He is not toxic-appearing.   HENT:      Head: Atraumatic.      Right Ear: External ear normal.      Left Ear: External ear normal.      Nose: Nose normal.      Mouth/Throat:      Mouth: Mucous membranes are moist.   Eyes:      General:         Right eye: No discharge.         Left eye: No discharge.      Extraocular Movements: Extraocular movements intact.      Pupils: Pupils are equal, round, and reactive to light.   Cardiovascular:      Rate and Rhythm: Normal rate and regular rhythm.      Pulses: Normal pulses.      Heart sounds: Normal heart sounds. No murmur heard.     No gallop.   Pulmonary:      Effort: Pulmonary effort is normal. No respiratory distress.      Breath sounds: No wheezing, rhonchi or rales.   Abdominal:      General: There is no distension.      Palpations: Abdomen is soft. There is no mass.      Tenderness: There is no abdominal tenderness. There is no guarding. "   Musculoskeletal:         General: No swelling or tenderness.      Cervical back: No tenderness.      Right lower leg: No edema.      Left lower leg: No edema.   Skin:     General: Skin is warm and dry.      Findings: No rash.   Neurological:      General: No focal deficit present.      Mental Status: He is alert and oriented to person, place, and time. Mental status is at baseline.      Motor: No weakness.      Gait: Gait normal.   Psychiatric:         Mood and Affect: Mood normal.         Thought Content: Thought content normal.          Result Review :   The following data was reviewed by: Rolando Lopez MD on 10/15/2021:                  Assessment and Plan    Diagnoses and all orders for this visit:    1. Hospital discharge follow-up (Primary)  Assessment & Plan:  This was the indication for his 12/24 office visit.  The H&P, op report, and discharge summary were reviewed.  Please see individualize headings for further details.      2. CAD S/P percutaneous coronary angioplasty  Assessment & Plan:  We reviewed the hospital notes, he had a stent to a 90% lesion in his circumflex, they are monitoring a 80% distal stenosis in his LAD that is downstream from his bypass graft.    He has not had any chest pain since discharge, he was started on Brilinta, he is taking this in addition to baby aspirin.  He is aware to avoid nonsteroidals other than Tylenol.    Patient was not sure whether to continue the isosorbide or not, I discussed with him given the untreated lesion in the LAD I suspect Dr. Refugio Mejia would want him to continue this, so he will get back on it until his follow-up with him at least in a couple of weeks.      3. Primary hypertension  Assessment & Plan:  Blood pressure remains well-controlled and his pulse is in the mid 60s as of his 12/24 hospital follow-up.  He is maintained on just low-dose metoprolol for his underlying CAD, stable to continue same.      4. Iron deficiency anemia, unspecified iron  deficiency anemia type  -     Ferritin; Future  -     Iron Profile; Future    5. Mixed hyperlipidemia  Assessment & Plan:  Patient's LDL was down little further to 93 when it was checked during his recent hospitalization.  He is maxed out on Nexletol and Zetia, he is intolerant to statins.    Discussed with patient that he definitely benefit and probably qualify for PCSK-9 inhibitors, asked him to review this with Dr. Refugio Mejia at his upcoming appointment.      6. Ulcerative pancolitis without complication  Overview:  Patient is followed by Dr. Naranjo for this.        Assessment & Plan:  Discussed with patient to reach out to us or Dr. Steiner if he has any blood in the stool, he will be at higher risk for this now that he is on Brilinta.    Additionally patient is not on any antiacids, no H2 blocker, no PPI.  He has not had any history of peptic ulcer disease.  Regardless given Brilinta and aspirin presently, will get a H. pylori stool antigen test to ensure he does not have this infection.    Orders:  -     H. Pylori Antigen, Stool - Stool, Per Rectum; Future             --  --  OLDER NOTES:  ANNUAL PHYSICAL 4/21; d/w all labs; no ischemic sxs; no changes PFSH.  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    LIPIDS at goal with LDL of 74...82 with no changes yet...142 b/c taking PRN due to leg pain, so change to another... and is tolerating lipitor, so will titrate and blame the R leg pain on his back and eval when he desires...82, but will try 80 mg now...68, but , so need to drop back down and add zetia when LFT's normalize...on 40 Lipitor with qod Zetia=LDL 73 and ALT only 40, so go to qd on Zetia, but no more than 40 of lipitor...69 is best gonna get; LFT's neg 7/18 OV...81 is ok  for now...120 in 1/20 b/c sick in hosp past month; no changes needed...74 is goal... 59---> 80 in 4/21, so increase on RTO if same.  --  HTN remains well controlled=ditto 10/20.  --  HYPOTHYROIDISM...increase again 8/17...0.3 despite back on lower dose=112, so stay on it... stable on this dose 1/19...TSH 0.16, so rec try 100 in 7/19...was fine in 11/19, but 23 at 1/20 OV after a month in hosp; I d/w finish up the 112 he has on hand and then back to 100---> up 4/21, but no c/o, so defer RTO.  --  PORTAL VEIN THROMBOSIS occurred after the stenosis below, so it has been over 6 mo as of 10/20 OV and he has not had a restenosis after temp stents removed, so I agree with stopping Eliquis at this time---> no sxs this regard 4/21.  --  PSC CIRRHOSIS=dx'd 10/18 after elevated LFT's noted during eval of upper back pain with jaundice; s/p failed cannulation of CBD 12/21/18...admitted [facility] 12/19 with biliary stricture and had failed ERCP again; required percutaneous drain; concern for chloangiocarcinoma noted; was d/c on the 13 and readmitted on 12/26 and required stents; then 3rd admit for portal vein thrombosis; has f/u with Dr Pena...they need to put in permanent stents sometime, but pt wants to wait as of 4/20 due to virus...stents out 5/20 with stable labs...ditto 10/20 and they didn't call him back and that's ok since LFTs wnl---> AFP/lipase/LFT's neg 4/21.    ULCERATIVE COLITIS since '82 on below and with no recent flare...says no issues 7/19...c-scope 9/19 was neg---> no bowel issues 4/21.  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  --  VIT D DEF wnl at 37...later still.  R ULNAR ENTRAPMENT 2/18 = tender, so will try medrol; to ortho if no better... better after tx per chiropractor, so defer as of 7/18 OV.  --  PTOSIS...this is not MG, eval per optho for surgery...here for pre-op clearance...10/3...no CP/SOB, c/w meds, labs  noted...s/p 10/13.  HOARSENESS d/w me at 7/18 OV; no concerning meds, no sprays OTC, no A.R., no GERD; will ask ENT to eval for polyps.  --  --  PSA 3.3 (6/13/2024)  COLON 7/23 = wnl = annually per Dr Naranjo's recs.    (, 2 kids=son here and girl in [facility]= still working with [employer] as of 12/23 OV).    Follow Up   Return for Next scheduled follow up.  Patient was given instructions and counseling regarding his condition or for health maintenance advice. Please see specific information pulled into the AVS if appropriate.

## 2024-12-31 NOTE — ASSESSMENT & PLAN NOTE
This was the indication for his 12/24 office visit.  The H&P, op report, and discharge summary were reviewed.  Please see individualize headings for further details.

## 2025-01-17 ENCOUNTER — LAB (OUTPATIENT)
Dept: LAB | Facility: HOSPITAL | Age: 70
End: 2025-01-17
Payer: MEDICARE

## 2025-01-17 DIAGNOSIS — K51.00 ULCERATIVE PANCOLITIS WITHOUT COMPLICATION: ICD-10-CM

## 2025-01-17 LAB — H. PYLORI ANTIGEN STOOL: NEGATIVE

## 2025-01-17 PROCEDURE — 87338 HPYLORI STOOL AG IA: CPT

## 2025-04-28 RX ORDER — FOLIC ACID 1 MG/1
1000 TABLET ORAL DAILY
Qty: 90 TABLET | Refills: 1 | Status: SHIPPED | OUTPATIENT
Start: 2025-04-28

## 2025-05-01 ENCOUNTER — CLINICAL SUPPORT (OUTPATIENT)
Dept: GASTROENTEROLOGY | Facility: CLINIC | Age: 70
End: 2025-05-01
Payer: MEDICARE

## 2025-05-01 ENCOUNTER — PREP FOR SURGERY (OUTPATIENT)
Dept: OTHER | Facility: HOSPITAL | Age: 70
End: 2025-05-01
Payer: MEDICARE

## 2025-05-01 ENCOUNTER — TELEPHONE (OUTPATIENT)
Dept: GASTROENTEROLOGY | Facility: CLINIC | Age: 70
End: 2025-05-01
Payer: MEDICARE

## 2025-05-01 DIAGNOSIS — Z12.11 COLON CANCER SCREENING: Primary | ICD-10-CM

## 2025-05-01 RX ORDER — CLOPIDOGREL BISULFATE 75 MG/1
TABLET ORAL
COMMUNITY
Start: 2025-04-24

## 2025-05-01 RX ORDER — SODIUM PICOSULFATE, MAGNESIUM OXIDE, AND ANHYDROUS CITRIC ACID 12; 3.5; 1 G/175ML; G/175ML; MG/175ML
175 LIQUID ORAL TAKE AS DIRECTED
Qty: 350 ML | Refills: 0 | Status: SHIPPED | OUTPATIENT
Start: 2025-05-01

## 2025-05-01 NOTE — PROGRESS NOTES
Troy FENTON Gary  1955  69 y.o.    Reason for call: 1 year recall  If recall, please list diagnosis: colon screening  Prep prescribed: Clenpiq  Prep instructions reviewed with patient and sent to patient via [employer]  Is the patient currently on any injectable or oral medications for weight loss or diabetes? No  Clearance needed? Yes  If yes, what clearance is needed? Blood thinner and Cardiology  Clearance has been requested from DR GRIFFIN  The patient has been scheduled for: Colonoscopy    Troy Vega is aware they have been scheduled for a screening colonoscopy. Patient has expressed they are not having any symptoms at all.     After your procedure, you will be contacted with results. Please confirm the best phone # to reach the patient: [phone]  Family history of colon cancer? No  If yes, indicate relative: N/A  Tentative Procedure Date: 5/20/2025    Date/Place of last Scope: 7/26/2023 [facility]  Able to obtain report? yes    Family History   Problem Relation Age of Onset    Colon cancer Neg Hx     Malig Hyperthermia Neg Hx      Past Medical History:   Diagnosis Date    Abnormal finding on radiology exam     Atherosclerosis of coronary artery bypass graft(s) without angina pectoris     Atrophy of thyroid (acquired)     Decreased white blood cell count     Elevated CK     Hypercholesterolemia     Hypothyroidism     Leukopenia     Mixed hyperlipidemia     Other decreased white blood cell count     Other fatigue     Primary sclerosing cholangitis     Ulcerative (chronic) pancolitis without complications     Ulcerative colitis, unspecified, without complications      Allergies   Allergen Reactions    Levofloxacin Other (See Comments)     Destroyed his muscles    Statins Myalgia    Oxycodone GI Intolerance    Oxycodone Hcl GI Intolerance     Past Surgical History:   Procedure Laterality Date    APPENDECTOMY  1980    CARDIAC CATHETERIZATION      12/2019 Cath placement (2)    CARDIAC CATHETERIZATION Left  12/23/2024    Procedure: Cardiac Catheterization/Vascular Study;  Surgeon: Elmer Sandhu MD;  Location: [facility] CATH INVASIVE LOCATION;  Service: Cardiovascular;  Laterality: Left;    COLONOSCOPY      COLONOSCOPY N/A 7/26/2023    Procedure: COLONOSCOPY WITH BIOPSIES;  Surgeon: Gabino Naranjo MD;  Location: [facility] ENDOSCOPY;  Service: Gastroenterology;  Laterality: N/A;  NORMAL COLONOSCOPY    CORONARY ARTERY BYPASS GRAFT      CABG 7/2011 3V    PANCREAS SURGERY      12/2018 Stent in pancreas    UPPER GASTROINTESTINAL ENDOSCOPY       Social History     Socioeconomic History    Marital status:    Tobacco Use    Smoking status: Never     Passive exposure: Never    Smokeless tobacco: Never   Vaping Use    Vaping status: Never Used   Substance and Sexual Activity    Alcohol use: Never    Drug use: Never    Sexual activity: Defer       Current Outpatient Medications:     aspirin 81 MG EC tablet, Take 1 tablet by mouth Daily., Disp: , Rfl:     clopidogrel (PLAVIX) 75 MG tablet, , Disp: , Rfl:     ezetimibe (ZETIA) 10 MG tablet, Take 1 tablet by mouth Daily., Disp: 90 tablet, Rfl: 1    folic acid (FOLVITE) 1 MG tablet, TAKE 1 TABLET BY MOUTH DAILY, Disp: 90 tablet, Rfl: 1    isosorbide mononitrate (IMDUR) 30 MG 24 hr tablet, Take 1 tablet by mouth Daily., Disp: , Rfl:     levothyroxine (SYNTHROID, LEVOTHROID) 88 MCG tablet, TAKE 1 TABLET BY MOUTH DAILY, Disp: 90 tablet, Rfl: 1    metoprolol succinate XL (TOPROL-XL) 25 MG 24 hr tablet, Take 1 tablet by mouth Daily., Disp: , Rfl:     Multiple Vitamin (multivitamin) capsule, Take 1 capsule by mouth Daily., Disp: , Rfl:     Nexletol 180 MG tablet, Take 1 tablet by mouth Daily., Disp: , Rfl:     nitroglycerin (NITROSTAT) 0.4 MG SL tablet, , Disp: , Rfl:     sulfaSALAzine (AZULFIDINE) 500 MG tablet, TAKE TWO TABLETS BY MOUTH TWICE A DAY, Disp: 360 tablet, Rfl: 3    ticagrelor (BRILINTA) 90 MG tablet tablet, Take 1 tablet by mouth 2 (Two) Times a Day. (Patient not  taking: Reported on 5/1/2025), Disp: 180 tablet, Rfl: 3

## 2025-05-05 ENCOUNTER — TELEPHONE (OUTPATIENT)
Dept: GASTROENTEROLOGY | Facility: CLINIC | Age: 70
End: 2025-05-05
Payer: MEDICARE

## 2025-05-05 NOTE — TELEPHONE ENCOUNTER
ENDO RECONCILIATION  Verify source of procedure(s): Nurse/MA direct access  05/2025  If other, please list source: 1 yr colon recall 07/2023  TIME OUT-CONFIRM CORRECT PROCEDURE: Colonoscopy  Cardiology: Dr Brownlee   Pulmonology: none  Blood thinner: Plavix    GLP-1: none  Additional DX/indication for procedure: 1yr colon recall-Ulcerative pan colitis   Please include any other notes relevant to endo reconciliation:  N/A

## 2025-05-06 RX ORDER — SULFASALAZINE 500 MG/1
1000 TABLET ORAL 2 TIMES DAILY
Qty: 360 TABLET | Refills: 1 | Status: SHIPPED | OUTPATIENT
Start: 2025-05-06

## 2025-05-09 ENCOUNTER — EXTERNAL PBMM DATA (OUTPATIENT)
Dept: PHARMACY | Facility: OTHER | Age: 70
End: 2025-05-09
Payer: MEDICARE

## 2025-05-13 NOTE — PRE-PROCEDURE INSTRUCTIONS
"Called pt in regards to procedure on 5/20/25.Pt told to arrive at 0730 at entrance \"C\" and explained that this is an arrival time, not the procedure time. Expect to be here for 3 to 4 hours.     Must have  over the age of 18 to drive home. May have two visitors; however, children under the age of 12 must stay in waiting room with an adult.     Educated on clear liquid diet(no red or purple), bowel prep, and NPO. Pt was told that nothing can be placed in mouth two hours prior to arrival including gum, mints, cigarettes. Meds must be taken at least two hours prior to arrival except for diabetic medications, blood thinners, and diuretics; they must be held the night prior and the day of procedure. Weight-loss injections with weekly doses must be held a week prior.     Instructed pt to hold Plavix for 5 days prior to procedure.     Pt verbalized understanding and instructed to call back for any questions or concerns.  "

## 2025-05-19 ENCOUNTER — ANESTHESIA EVENT (OUTPATIENT)
Dept: GASTROENTEROLOGY | Facility: HOSPITAL | Age: 70
End: 2025-05-19
Payer: MEDICARE

## 2025-05-19 NOTE — ANESTHESIA PREPROCEDURE EVALUATION
Anesthesia Evaluation     Patient summary reviewed and Nursing notes reviewed   NPO Solid Status: > 8 hours  NPO Liquid Status: > 2 hours           Airway   Mallampati: I  TM distance: >3 FB  Neck ROM: full  No difficulty expected  Dental      Pulmonary - normal exam    breath sounds clear to auscultation  Cardiovascular - normal exam    ECG reviewed  Rhythm: regular  Rate: normal    (+) hypertension well controlled 2 medications or greater, CAD, CABG (3 vessel- 2012, stent x 2 placed 12/24, 85% blockage  LAD post stent placement, pt reports no hx of chest pain or SOA after stent placement), hyperlipidemia      Neuro/Psych  (+) numbness  GI/Hepatic/Renal/Endo    (+) liver disease (Primary sclerosing cholangitis), thyroid problem hypothyroidism    Musculoskeletal     Abdominal  - normal exam    Abdomen: soft.  Bowel sounds: normal.   Substance History      OB/GYN          Other   arthritis,     ROS/Med Hx Other: Cardiac Clearance in chart  Hx of UC  Pt of Plavix x 5 days    - OTHERWISE NORMAL ECG -  Sinus rhythm  Borderline  right axis deviation  No previous ECG available for comparison  Electronically Signed By: Tapan Landry ([facility]) 2024-12-26 10:43:54  Date and Time of Study:2024-12-24 07:46:43                      Anesthesia Plan    ASA 3     general   total IV anesthesia  (Patient understands anesthesia not responsible for dental damage. Risks explained including allergic reactions, BP, HR, O2 changes, aspiration, advanced airway placement. Pt verbalized understanding.)  intravenous induction     Anesthetic plan, risks, benefits, and alternatives have been provided, discussed and informed consent has been obtained with: patient.  Pre-procedure education provided  Plan discussed with CRNA.        CODE STATUS:

## 2025-05-20 ENCOUNTER — TELEPHONE (OUTPATIENT)
Dept: GASTROENTEROLOGY | Facility: CLINIC | Age: 70
End: 2025-05-20
Payer: MEDICARE

## 2025-05-20 ENCOUNTER — ANESTHESIA (OUTPATIENT)
Dept: GASTROENTEROLOGY | Facility: HOSPITAL | Age: 70
End: 2025-05-20
Payer: MEDICARE

## 2025-05-20 ENCOUNTER — HOSPITAL ENCOUNTER (OUTPATIENT)
Facility: HOSPITAL | Age: 70
Setting detail: HOSPITAL OUTPATIENT SURGERY
Discharge: HOME | End: 2025-05-20
Attending: INTERNAL MEDICINE | Admitting: INTERNAL MEDICINE
Payer: MEDICARE

## 2025-05-20 VITALS
OXYGEN SATURATION: 100 % | SYSTOLIC BLOOD PRESSURE: 125 MMHG | TEMPERATURE: 96.6 F | HEART RATE: 71 BPM | BODY MASS INDEX: 21.22 KG/M2 | RESPIRATION RATE: 20 BRPM | WEIGHT: 152.12 LBS | DIASTOLIC BLOOD PRESSURE: 73 MMHG

## 2025-05-20 PROCEDURE — 25810000003 LACTATED RINGERS PER 1000 ML

## 2025-05-20 PROCEDURE — 25010000002 LIDOCAINE PF 2% 2 % SOLUTION: Performed by: NURSE ANESTHETIST, CERTIFIED REGISTERED

## 2025-05-20 PROCEDURE — 25010000002 PROPOFOL 10 MG/ML EMULSION: Performed by: NURSE ANESTHETIST, CERTIFIED REGISTERED

## 2025-05-20 RX ORDER — LIDOCAINE HYDROCHLORIDE 20 MG/ML
INJECTION, SOLUTION EPIDURAL; INFILTRATION; INTRACAUDAL; PERINEURAL AS NEEDED
Status: DISCONTINUED | OUTPATIENT
Start: 2025-05-20 | End: 2025-05-20 | Stop reason: SURG

## 2025-05-20 RX ORDER — SODIUM CHLORIDE, SODIUM LACTATE, POTASSIUM CHLORIDE, CALCIUM CHLORIDE 600; 310; 30; 20 MG/100ML; MG/100ML; MG/100ML; MG/100ML
30 INJECTION, SOLUTION INTRAVENOUS CONTINUOUS
Status: DISCONTINUED | OUTPATIENT
Start: 2025-05-20 | End: 2025-05-20 | Stop reason: HOSPADM

## 2025-05-20 RX ORDER — PROPOFOL 10 MG/ML
VIAL (ML) INTRAVENOUS CONTINUOUS PRN
Status: DISCONTINUED | OUTPATIENT
Start: 2025-05-20 | End: 2025-05-20 | Stop reason: SURG

## 2025-05-20 RX ADMIN — PROPOFOL 175 MCG/KG/MIN: 10 INJECTION, EMULSION INTRAVENOUS at 09:50

## 2025-05-20 RX ADMIN — LIDOCAINE HYDROCHLORIDE 60 MG: 20 INJECTION, SOLUTION EPIDURAL; INFILTRATION; INTRACAUDAL; PERINEURAL at 09:50

## 2025-05-20 RX ADMIN — PROPOFOL 80 MG: 10 INJECTION, EMULSION INTRAVENOUS at 09:49

## 2025-05-20 RX ADMIN — SODIUM CHLORIDE, POTASSIUM CHLORIDE, SODIUM LACTATE AND CALCIUM CHLORIDE 30 ML/HR: 600; 310; 30; 20 INJECTION, SOLUTION INTRAVENOUS at 08:56

## 2025-05-20 NOTE — H&P
Pre Procedure History & Physical    Chief Complaint:   Ulcerative colitis  PSC    Subjective     HPI:   As above    Past Medical History:   Past Medical History:   Diagnosis Date    Abnormal finding on radiology exam     Atherosclerosis of coronary artery bypass graft(s) without angina pectoris     Atrophy of thyroid (acquired)     Decreased white blood cell count     Elevated CK     Hypercholesterolemia     Hypothyroidism     Leukopenia     Mixed hyperlipidemia     Other decreased white blood cell count     Other fatigue     Primary sclerosing cholangitis     Ulcerative (chronic) pancolitis without complications     Ulcerative colitis, unspecified, without complications        Past Surgical History:  Past Surgical History:   Procedure Laterality Date    APPENDECTOMY  1980    CARDIAC CATHETERIZATION      12/2019 Cath placement (2)    CARDIAC CATHETERIZATION Left 12/23/2024    Procedure: Cardiac Catheterization/Vascular Study;  Surgeon: Elmer Sandhu MD;  Location: [facility] CATH INVASIVE LOCATION;  Service: Cardiovascular;  Laterality: Left;    COLONOSCOPY      COLONOSCOPY N/A 7/26/2023    Procedure: COLONOSCOPY WITH BIOPSIES;  Surgeon: Gabino Naranjo MD;  Location: [facility] ENDOSCOPY;  Service: Gastroenterology;  Laterality: N/A;  NORMAL COLONOSCOPY    CORONARY ARTERY BYPASS GRAFT      CABG 7/2011 3V    PANCREAS SURGERY      12/2018 Stent in pancreas    UPPER GASTROINTESTINAL ENDOSCOPY         Family History:  Family History   Problem Relation Age of Onset    Colon cancer Neg Hx     Malig Hyperthermia Neg Hx        Social History:   reports that he has never smoked. He has never been exposed to tobacco smoke. He has never used smokeless tobacco. He reports that he does not drink alcohol and does not use drugs.    Medications:   Medications Prior to Admission   Medication Sig Dispense Refill Last Dose/Taking    aspirin 81 MG EC tablet Take 1 tablet by mouth Daily.   Past Week    ezetimibe (ZETIA) 10 MG tablet  Take 1 tablet by mouth Daily. 90 tablet 1 Past Week    folic acid (FOLVITE) 1 MG tablet TAKE 1 TABLET BY MOUTH DAILY 90 tablet 1 Past Week    isosorbide mononitrate (IMDUR) 30 MG 24 hr tablet Take 1 tablet by mouth Daily.   Past Week    levothyroxine (SYNTHROID, LEVOTHROID) 88 MCG tablet TAKE 1 TABLET BY MOUTH DAILY 90 tablet 1 Past Week    metoprolol succinate XL (TOPROL-XL) 25 MG 24 hr tablet Take 1 tablet by mouth Daily.   Past Week    Multiple Vitamin (multivitamin) capsule Take 1 capsule by mouth Daily.   Past Week    Nexletol 180 MG tablet Take 1 tablet by mouth Daily.   Past Week    Sod Picosulfate-Mag Ox-Cit Acd (Clenpiq) 10-3.5-12 MG-GM -GM/175ML solution Take 175 mL by mouth Take As Directed. 350 mL 0 5/20/2025 Morning    sulfaSALAzine (AZULFIDINE) 500 MG tablet TAKE 2 TABLETS BY MOUTH TWICE A  tablet 1 Past Week    clopidogrel (PLAVIX) 75 MG tablet    5/15/2025    nitroglycerin (NITROSTAT) 0.4 MG SL tablet    Unknown    ticagrelor (BRILINTA) 90 MG tablet tablet Take 1 tablet by mouth 2 (Two) Times a Day. (Patient not taking: Reported on 5/1/2025) 180 tablet 3        Allergies:  Levofloxacin, Statins, Oxycodone, and Oxycodone hcl        Objective     Blood pressure 144/68, pulse 56, temperature 97 °F (36.1 °C), temperature source Temporal, resp. rate 18, weight 69 kg (152 lb 1.9 oz), SpO2 98%.    Physical Exam   Constitutional: Pt is oriented to person, place, and time and well-developed, well-nourished, and in no distress.   Mouth/Throat: Oropharynx is clear and moist.   Neck: Normal range of motion.   Cardiovascular: Normal rate, regular rhythm and normal heart sounds.    Pulmonary/Chest: Effort normal and breath sounds normal.   Abdominal: Soft. Nontender  Skin: Skin is warm and dry.   Psychiatric: Mood, memory, affect and judgment normal.     Assessment & Plan     Diagnosis:  Ulcerative colitis  PSC    Anticipated Surgical Procedure:  colonoscopy    The risks, benefits, and alternatives of this  procedure have been discussed with the patient or the responsible party- the patient understands and agrees to proceed.

## 2025-05-20 NOTE — TELEPHONE ENCOUNTER
I have reviewed the patients colonoscopy report. The report showed a normal colonoscopy with internal hemorrhoids.  No polyps removed or specimens collected.  Repeat colonoscopy in 1 years due to history of ulcerative pancolitis. Please place in recall. Send letter.

## 2025-05-20 NOTE — ANESTHESIA POSTPROCEDURE EVALUATION
Patient: Troy Vega    Procedure Summary       Date: 05/20/25 Room / Location: [facility] ENDOSCOPY 2 / [facility] ENDOSCOPY    Anesthesia Start: 0948 Anesthesia Stop: 1010    Procedure: COLONOSCOPY Diagnosis:       Colon cancer screening      (Colon cancer screening [Z12.11])    Surgeons: Gabino Naranjo MD Provider: Antonino Franco CRNA    Anesthesia Type: general ASA Status: 3            Anesthesia Type: general    Vitals  Vitals Value Taken Time   /73 05/20/25 10:30   Temp 35.9 °C (96.6 °F) 05/20/25 10:25   Pulse 64 05/20/25 10:33   Resp 20 05/20/25 10:30   SpO2 96 % 05/20/25 10:33   Vitals shown include unfiled device data.        Post Anesthesia Care and Evaluation    Patient location during evaluation: bedside  Patient participation: complete - patient participated  Level of consciousness: awake  Pain management: adequate    Airway patency: patent  Anesthetic complications: No anesthetic complications  PONV Status: controlled  Cardiovascular status: acceptable and stable  Respiratory status: acceptable

## 2025-06-10 ENCOUNTER — LAB (OUTPATIENT)
Dept: LAB | Facility: HOSPITAL | Age: 70
End: 2025-06-10
Payer: MEDICARE

## 2025-06-10 DIAGNOSIS — E03.4 HYPOTHYROIDISM DUE TO ACQUIRED ATROPHY OF THYROID: ICD-10-CM

## 2025-06-12 RX ORDER — LEVOTHYROXINE SODIUM 88 UG/1
88 TABLET ORAL DAILY
Qty: 90 TABLET | Refills: 3 | Status: SHIPPED | OUTPATIENT
Start: 2025-06-12

## 2025-06-16 ENCOUNTER — LAB (OUTPATIENT)
Dept: LAB | Facility: HOSPITAL | Age: 70
End: 2025-06-16
Payer: MEDICARE

## 2025-06-16 DIAGNOSIS — I10 PRIMARY HYPERTENSION: ICD-10-CM

## 2025-06-16 DIAGNOSIS — E78.2 MIXED HYPERLIPIDEMIA: ICD-10-CM

## 2025-06-16 DIAGNOSIS — Z00.00 WELL ADULT EXAM: ICD-10-CM

## 2025-06-16 DIAGNOSIS — D50.9 IRON DEFICIENCY ANEMIA, UNSPECIFIED IRON DEFICIENCY ANEMIA TYPE: ICD-10-CM

## 2025-06-16 DIAGNOSIS — Z95.1 HX OF THREE VESSEL CORONARY ARTERY BYPASS: ICD-10-CM

## 2025-06-16 DIAGNOSIS — Z12.5 PROSTATE CANCER SCREENING: ICD-10-CM

## 2025-06-16 LAB
ALBUMIN SERPL-MCNC: 4.5 G/DL (ref 3.5–5.2)
ALBUMIN/GLOB SERPL: 1.7 G/DL
ALP SERPL-CCNC: 75 U/L (ref 39–117)
ALT SERPL W P-5'-P-CCNC: 22 U/L (ref 1–41)
ANION GAP SERPL CALCULATED.3IONS-SCNC: 10.3 MMOL/L (ref 5–15)
AST SERPL-CCNC: 36 U/L (ref 1–40)
BASOPHILS # BLD AUTO: 0.04 10*3/MM3 (ref 0–0.2)
BASOPHILS NFR BLD AUTO: 1.2 % (ref 0–1.5)
BILIRUB SERPL-MCNC: 0.5 MG/DL (ref 0–1.2)
BUN SERPL-MCNC: 12 MG/DL (ref 8–23)
BUN/CREAT SERPL: 10.4 (ref 7–25)
CALCIUM SPEC-SCNC: 9.7 MG/DL (ref 8.6–10.5)
CHLORIDE SERPL-SCNC: 105 MMOL/L (ref 98–107)
CHOLEST SERPL-MCNC: 159 MG/DL (ref 0–200)
CO2 SERPL-SCNC: 25.7 MMOL/L (ref 22–29)
CREAT SERPL-MCNC: 1.15 MG/DL (ref 0.76–1.27)
DEPRECATED RDW RBC AUTO: 43.7 FL (ref 37–54)
EGFRCR SERPLBLD CKD-EPI 2021: 69.3 ML/MIN/1.73
EOSINOPHIL # BLD AUTO: 0 10*3/MM3 (ref 0–0.4)
EOSINOPHIL NFR BLD AUTO: 0 % (ref 0.3–6.2)
ERYTHROCYTE [DISTWIDTH] IN BLOOD BY AUTOMATED COUNT: 12.1 % (ref 12.3–15.4)
FERRITIN SERPL-MCNC: 310 NG/ML (ref 30–400)
GLOBULIN UR ELPH-MCNC: 2.7 GM/DL
GLUCOSE SERPL-MCNC: 90 MG/DL (ref 65–99)
HBA1C MFR BLD: 5.4 % (ref 4.8–5.6)
HCT VFR BLD AUTO: 42.2 % (ref 37.5–51)
HDLC SERPL-MCNC: 37 MG/DL (ref 40–60)
HGB BLD-MCNC: 13.5 G/DL (ref 13–17.7)
IMM GRANULOCYTES # BLD AUTO: 0 10*3/MM3 (ref 0–0.05)
IMM GRANULOCYTES NFR BLD AUTO: 0 % (ref 0–0.5)
IRON 24H UR-MRATE: 93 MCG/DL (ref 59–158)
IRON SATN MFR SERPL: 27 % (ref 20–50)
LDLC SERPL CALC-MCNC: 101 MG/DL (ref 0–100)
LDLC/HDLC SERPL: 2.67 {RATIO}
LYMPHOCYTES # BLD AUTO: 1.21 10*3/MM3 (ref 0.7–3.1)
LYMPHOCYTES NFR BLD AUTO: 35.5 % (ref 19.6–45.3)
MCH RBC QN AUTO: 31.3 PG (ref 26.6–33)
MCHC RBC AUTO-ENTMCNC: 32 G/DL (ref 31.5–35.7)
MCV RBC AUTO: 97.7 FL (ref 79–97)
MONOCYTES # BLD AUTO: 0.49 10*3/MM3 (ref 0.1–0.9)
MONOCYTES NFR BLD AUTO: 14.4 % (ref 5–12)
NEUTROPHILS NFR BLD AUTO: 1.67 10*3/MM3 (ref 1.7–7)
NEUTROPHILS NFR BLD AUTO: 48.9 % (ref 42.7–76)
NRBC BLD AUTO-RTO: 0 /100 WBC (ref 0–0.2)
PLATELET # BLD AUTO: 300 10*3/MM3 (ref 140–450)
PMV BLD AUTO: 9.7 FL (ref 6–12)
POTASSIUM SERPL-SCNC: 4.2 MMOL/L (ref 3.5–5.2)
PROT SERPL-MCNC: 7.2 G/DL (ref 6–8.5)
PSA SERPL-MCNC: 3.03 NG/ML (ref 0–4)
RBC # BLD AUTO: 4.32 10*6/MM3 (ref 4.14–5.8)
SODIUM SERPL-SCNC: 141 MMOL/L (ref 136–145)
TIBC SERPL-MCNC: 338 MCG/DL (ref 298–536)
TRANSFERRIN SERPL-MCNC: 227 MG/DL (ref 200–360)
TRIGL SERPL-MCNC: 116 MG/DL (ref 0–150)
TSH SERPL DL<=0.05 MIU/L-ACNC: 0.95 UIU/ML (ref 0.27–4.2)
VLDLC SERPL-MCNC: 21 MG/DL (ref 5–40)
WBC NRBC COR # BLD AUTO: 3.41 10*3/MM3 (ref 3.4–10.8)

## 2025-06-16 PROCEDURE — 82728 ASSAY OF FERRITIN: CPT

## 2025-06-16 PROCEDURE — 83036 HEMOGLOBIN GLYCOSYLATED A1C: CPT

## 2025-06-16 PROCEDURE — 83540 ASSAY OF IRON: CPT

## 2025-06-16 PROCEDURE — 80053 COMPREHEN METABOLIC PANEL: CPT

## 2025-06-16 PROCEDURE — 85025 COMPLETE CBC W/AUTO DIFF WBC: CPT

## 2025-06-16 PROCEDURE — G0103 PSA SCREENING: HCPCS

## 2025-06-16 PROCEDURE — 80061 LIPID PANEL: CPT

## 2025-06-16 PROCEDURE — 36415 COLL VENOUS BLD VENIPUNCTURE: CPT

## 2025-06-16 PROCEDURE — 84466 ASSAY OF TRANSFERRIN: CPT

## 2025-06-18 ENCOUNTER — OFFICE VISIT (OUTPATIENT)
Age: 70
End: 2025-06-18
Payer: MEDICARE

## 2025-06-18 VITALS
OXYGEN SATURATION: 98 % | DIASTOLIC BLOOD PRESSURE: 70 MMHG | HEIGHT: 71 IN | BODY MASS INDEX: 19.88 KG/M2 | SYSTOLIC BLOOD PRESSURE: 120 MMHG | HEART RATE: 55 BPM | WEIGHT: 142 LBS

## 2025-06-18 DIAGNOSIS — Z00.00 MEDICARE ANNUAL WELLNESS VISIT, SUBSEQUENT: Primary | ICD-10-CM

## 2025-06-18 DIAGNOSIS — E03.4 HYPOTHYROIDISM DUE TO ACQUIRED ATROPHY OF THYROID: ICD-10-CM

## 2025-06-18 DIAGNOSIS — G89.29 CHRONIC BILATERAL LOW BACK PAIN WITH RIGHT-SIDED SCIATICA: ICD-10-CM

## 2025-06-18 DIAGNOSIS — M54.41 CHRONIC BILATERAL LOW BACK PAIN WITH RIGHT-SIDED SCIATICA: ICD-10-CM

## 2025-06-18 DIAGNOSIS — I10 PRIMARY HYPERTENSION: ICD-10-CM

## 2025-06-18 DIAGNOSIS — E78.2 MIXED HYPERLIPIDEMIA: ICD-10-CM

## 2025-06-18 PROBLEM — Z12.11 COLON CANCER SCREENING: Status: RESOLVED | Noted: 2025-05-01 | Resolved: 2025-06-18

## 2025-06-18 PROBLEM — Z09 HOSPITAL DISCHARGE FOLLOW-UP: Status: RESOLVED | Noted: 2024-12-31 | Resolved: 2025-06-18

## 2025-06-18 RX ORDER — PREDNISONE 20 MG/1
20 TABLET ORAL 2 TIMES DAILY
Qty: 10 TABLET | Refills: 0 | Status: SHIPPED | OUTPATIENT
Start: 2025-06-18 | End: 2025-06-23

## 2025-06-18 RX ORDER — GABAPENTIN 100 MG/1
CAPSULE ORAL
Qty: 60 CAPSULE | Refills: 1 | Status: SHIPPED | OUTPATIENT
Start: 2025-06-18

## 2025-06-18 NOTE — ASSESSMENT & PLAN NOTE
Patient's blood pressure remained stable as of his 6/25 OV.  His pulse is in the mid 50s.  He just has low-dose metoprolol on board secondary to his CAD, is also on little bit of Imdur, certainly no other treatment indicated

## 2025-06-18 NOTE — PROGRESS NOTES
Subjective   The ABCs of the Annual Wellness Visit  Medicare Wellness Visit      Troy Vega is a 69 y.o. patient who presents for a Medicare Wellness Visit.    The following portions of the patient's history were reviewed and   updated as appropriate: allergies, current medications, past family history, past medical history, past social history, past surgical history, and problem list.    Compared to one year ago, the patient's physical   health is the same.  Compared to one year ago, the patient's mental   health is the same.    Recent Hospitalizations:  He [facility] within the past [facility].     Current Medical Providers:  Patient Care Team:  Rolando Lopez MD as PCP - General (Internal Medicine)  Humera Brownlee MD as Consulting Physician (Cardiology)  Keny Back MD as Consulting Physician (Orthopedic Surgery)  Gabino Naranjo MD as Consulting Physician (Gastroenterology)  Elmer Sandhu MD as Consulting Physician (Cardiology)    Outpatient Medications Prior to Visit   Medication Sig Dispense Refill    aspirin 81 MG EC tablet Take 1 tablet by mouth Daily.      clopidogrel (PLAVIX) 75 MG tablet       ezetimibe (ZETIA) 10 MG tablet Take 1 tablet by mouth Daily. 90 tablet 1    folic acid (FOLVITE) 1 MG tablet TAKE 1 TABLET BY MOUTH DAILY 90 tablet 1    isosorbide mononitrate (IMDUR) 30 MG 24 hr tablet Take 1 tablet by mouth Daily.      levothyroxine (SYNTHROID, LEVOTHROID) 88 MCG tablet TAKE 1 TABLET BY MOUTH DAILY 90 tablet 3    metoprolol succinate XL (TOPROL-XL) 25 MG 24 hr tablet Take 1 tablet by mouth Daily.      Multiple Vitamin (multivitamin) capsule Take 1 capsule by mouth Daily.      Nexletol 180 MG tablet Take 1 tablet by mouth Daily.      nitroglycerin (NITROSTAT) 0.4 MG SL tablet       sulfaSALAzine (AZULFIDINE) 500 MG tablet TAKE 2 TABLETS BY MOUTH TWICE A  tablet 1    Sod Picosulfate-Mag Ox-Cit Acd (Clenpiq) 10-3.5-12 MG-GM -GM/175ML solution  "Take 175 mL by mouth Take As Directed. (Patient not taking: Reported on 6/18/2025) 350 mL 0    ticagrelor (BRILINTA) 90 MG tablet tablet Take 1 tablet by mouth 2 (Two) Times a Day. (Patient not taking: Reported on 6/18/2025) 180 tablet 3     No facility-administered medications prior to visit.     No opioid medication identified on active medication list. I have reviewed chart for other potential  high risk medication/s and harmful drug interactions in the elderly.      Aspirin is on active medication list. Aspirin use is indicated based on review of current medical condition/s. Pros and cons of this therapy have been discussed today. Benefits of this medication outweigh potential harm.  Patient has been encouraged to continue taking this medication.  .      Patient Active Problem List   Diagnosis    Hx of three vessel coronary artery bypass    PSC (primary sclerosing cholangitis)    Primary hypertension    Hypothyroidism due to acquired atrophy of thyroid    Mixed hyperlipidemia    Ulcerative pancolitis without complication    Drug-induced leukopenia    Chronic left-sided low back pain with left-sided sciatica    Medicare annual wellness visit, subsequent    Hoarseness of voice    Primary osteoarthritis involving multiple joints    CAD S/P percutaneous coronary angioplasty    Hospital discharge follow-up     Advance Care Planning Advance Directive is on file.  ACP discussion was held with the patient during this visit. Patient has an advance directive in EMR which is still valid.             Objective   Vitals:    06/18/25 0850   BP: 145/64   Pulse: 55   SpO2: 98%   Weight: 64.4 kg (142 lb)   Height: 180.3 cm (71\")       Estimated body mass index is 19.8 kg/m² as calculated from the following:    Height as of this encounter: 180.3 cm (71\").    Weight as of this encounter: 64.4 kg (142 lb).    BMI is within normal parameters. No other follow-up for BMI required.           Does the patient have evidence of cognitive " impairment? No  Lab Results   Component Value Date    TRIG 116 2025    HDL 37 (L) 2025     (H) 2025    VLDL 21 2025    HGBA1C 5.40 2025                                                                                               Health  Risk Assessment    Smoking Status:  Social History     Tobacco Use   Smoking Status Never    Passive exposure: Never   Smokeless Tobacco Never     Alcohol Consumption:  Social History     Substance and Sexual Activity   Alcohol Use Never       Fall Risk Screen  STEADI Fall Risk Assessment was completed, and patient is at LOW risk for falls.Assessment completed on:2025    Depression Screening   Little interest or pleasure in doing things? Not at all   Feeling down, depressed, or hopeless? Not at all   PHQ-2 Total Score 0      Health Habits and Functional and Cognitive Screenin/18/2025     9:00 AM   Functional & Cognitive Status   Do you have difficulty preparing food and eating? No   Do you have difficulty bathing yourself, getting dressed or grooming yourself? No   Do you have difficulty using the toilet? No   Do you have difficulty moving around from place to place? No   Do you have trouble with steps or getting out of a bed or a chair? No   Current Diet Well Balanced Diet   Do you need help using the phone?  No   Are you deaf or do you have serious difficulty hearing?  Yes   Do you need help to go to places out of walking distance? No   Do you need help shopping? No   Do you need help preparing meals?  No   Do you need help with housework?  No   Do you need help with laundry? No   Do you need help taking your medications? No   Do you need help managing money? No   Do you ever drive or ride in a car without wearing a seat belt? No   Have you felt unusual stress, anger or loneliness in the last month? No   Do you have difficulty concentrating, remembering or making decisions? No           Age-appropriate Screening Schedule:  Refer  to the list below for future screening recommendations based on patient's age, sex and/or medical conditions. Orders for these recommended tests are listed in the plan section. The patient has been provided with a written plan.    Health Maintenance List  Health Maintenance   Topic Date Due    ANNUAL WELLNESS VISIT  06/18/2025    Hepatitis B (1 of 3 - Risk 3-dose series) 12/15/2025 (Originally 11/30/2016)    ZOSTER VACCINE (1 of 2) 12/15/2025 (Originally 11/30/2006)    INFLUENZA VACCINE  07/01/2025    COLORECTAL CANCER SCREENING  05/20/2026    LIPID PANEL  06/16/2026    TDAP/TD VACCINES (2 - Td or Tdap) 12/07/2032    HEPATITIS C SCREENING  Completed    Pneumococcal Vaccine 50+  Completed    COVID-19 Vaccine  Discontinued                                                                                                                                                CMS Preventative Services Quick Reference  Risk Factors Identified During Encounter  None Identified    The above risks/problems have been discussed with the patient.  Pertinent information has been shared with the patient in the After Visit Summary.  An After Visit Summary and PPPS were made available to the patient.    Follow Up:   Next Medicare Wellness visit to be scheduled in 1 year.     Assessment & Plan  Medicare annual wellness visit, subsequent  AWV completed 6/25.  Patient remains active and independent.  No falls in the past year.  He was hospitalized overnight in 12/24 in regards to chest pain.  His living will is already on file at the hospital.              Follow Up:   No follow-ups on file.

## 2025-06-18 NOTE — PROGRESS NOTES
Chief Complaint  Hypothyroidism, Hyperlipidemia, Hypertension, Medicare Wellness-subsequent, and Chest Pain (Patient stated he has been having some chest pain on the bone. 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 )    Subjective          Troy Vega presents to [facility]     Hyperlipidemia  Exacerbating diseases include hypothyroidism. Associated symptoms include chest pain. Pertinent negatives include no shortness of breath.   Hypothyroidism  Patient reports no depressed mood, fatigue or palpitations. His past medical history is significant for hyperlipidemia.   Chest Pain   Pertinent negatives include no abdominal pain, cough, fever, palpitations or shortness of breath.   His past medical history is significant for hyperlipidemia.   Hypertension  Associated symptoms: chest pain    Associated symptoms: no blurred vision, no palpitations and no shortness of breath      History of present illness:  Patient is a pleasant 68-year-old male with underlying coronary artery disease prior bypass, Crohn's disease, hypertension and hyperlipidemia, who is here 12/24 for routine 6-month follow-up.  We will review his labs and make further recommendations at that time.  ---> Patient being seen later 12/24 for hospital discharge follow-up:  Date admission: 12/23/2024  Date of discharge: 12/24/2024  1.  Chronic stable angina  2.  Abnormal treadmill stress test  3.  CAD status post left circumflex stenting on 12/23/2024  4.  Status post CABG x 3 in 2011 (LIMA to LAD and D1 and SUZANNE to RCA).    The LIMA to the LAD and SUZANNE to RCA were patent.  The left circumflex demonstrated proximal diffuse 60% stenosis.  The OM branch of the left circumflex demonstrated 90% proximal stenosis.  That lesion was stented.      There was an 80% mid to distal stenosis of the LAD beyond the anastomotic segment of the LIMA to the LAD.  The patient will be seen as outpatient  "for further evaluation.  The LAD lesion will be stented, if the patient continues to be symptomatic and having anginal pain.     Review of Systems   Constitutional:  Negative for appetite change, fatigue and fever.   HENT:  Negative for congestion and ear pain.    Eyes:  Negative for blurred vision.   Respiratory:  Negative for cough, chest tightness, shortness of breath and wheezing.    Cardiovascular:  Positive for chest pain. Negative for palpitations and leg swelling.   Gastrointestinal:  Negative for abdominal pain.   Genitourinary:  Negative for difficulty urinating, dysuria and hematuria.   Musculoskeletal:  Negative for arthralgias and gait problem.   Skin:  Negative for skin lesions.   Neurological:  Negative for syncope, memory problem and confusion.   Psychiatric/Behavioral:  Negative for self-injury and depressed mood.        Objective   Vital Signs:   /70 Comment: Home reading.  Pulse 55   Ht 180.3 cm (71\")   Wt 64.4 kg (142 lb)   SpO2 98%   BMI 19.80 kg/m²           Physical Exam  Vitals and nursing note reviewed.   Constitutional:       General: He is not in acute distress.     Appearance: Normal appearance. He is not toxic-appearing.   HENT:      Head: Atraumatic.      Right Ear: External ear normal.      Left Ear: External ear normal.      Nose: Nose normal.      Mouth/Throat:      Mouth: Mucous membranes are moist.   Eyes:      General:         Right eye: No discharge.         Left eye: No discharge.      Extraocular Movements: Extraocular movements intact.      Pupils: Pupils are equal, round, and reactive to light.   Cardiovascular:      Rate and Rhythm: Normal rate and regular rhythm.      Pulses: Normal pulses.      Heart sounds: Normal heart sounds. No murmur heard.     No gallop.   Pulmonary:      Effort: Pulmonary effort is normal. No respiratory distress.      Breath sounds: No wheezing, rhonchi or rales.   Abdominal:      General: There is no distension.      Palpations: Abdomen " is soft. There is no mass.      Tenderness: There is no abdominal tenderness. There is no guarding.   Musculoskeletal:         General: No swelling or tenderness.      Cervical back: No tenderness.      Right lower leg: No edema.      Left lower leg: No edema.   Skin:     General: Skin is warm and dry.      Findings: No rash.   Neurological:      General: No focal deficit present.      Mental Status: He is alert and oriented to person, place, and time. Mental status is at baseline.      Motor: No weakness.      Gait: Gait normal.   Psychiatric:         Mood and Affect: Mood normal.         Thought Content: Thought content normal.          Result Review :   The following data was reviewed by: Rolando Lopez MD on 10/15/2021:                  Assessment and Plan    Diagnoses and all orders for this visit:    1. Medicare annual wellness visit, subsequent (Primary)  Assessment & Plan:  AWV completed 6/25.  Patient remains active and independent.  No falls in the past year.  He was hospitalized overnight in 12/24 in regards to chest pain.  His living will is already on file at the hospital.           Orders:  -     Hemoglobin A1c; Future  -     CBC & Differential; Future    2. Primary hypertension  Assessment & Plan:  Patient's blood pressure remained stable as of his 6/25 OV.  His pulse is in the mid 50s.  He just has low-dose metoprolol on board secondary to his CAD, is also on little bit of Imdur, certainly no other treatment indicated    Orders:  -     Comprehensive Metabolic Panel; Future    3. Mixed hyperlipidemia  Assessment & Plan:  His LDL is stuck around to 100 as of his 6/25 OV.  He is on Nexletol and Zetia since he is intolerant to statins.    He is followed by cardiology, Dr. Refugio Mejia, so obviously will defer PCSK9 inhibitor to him.    Orders:  -     Lipid Panel; Future    4. Hypothyroidism due to acquired atrophy of thyroid  Assessment & Plan:  T states is stable at 0.9 as of his 6/25 OV.  He is on low-dose  88 mcg and stable to continue same.    Orders:  -     TSH; Future    5. Chronic bilateral low back pain with right-sided sciatica  Assessment & Plan:  This was an issue on the left side in 12/22, plain films were obtained, and he was on low-dose gabapentin for a time.    It is flaring up again in 6/25, but on the right side this time, rating down into his buttock.  There was no trauma, there are no danger signs, no change in bowel/bladder etc.    We will see if this will respond to low-dose medications again, asked patient to reach out to us in a couple of weeks if not improving, we will get films then and get him in with physical therapy at least.    Orders:  -     gabapentin (NEURONTIN) 100 MG capsule; Take 2 capsules every evening, may increase by 1 capsule every 3 days until taking 6 of them if needed.  Dispense: 60 capsule; Refill: 1    Other orders  -     predniSONE (DELTASONE) 20 MG tablet; Take 1 tablet by mouth 2 (Two) Times a Day for 5 days.  Dispense: 10 tablet; Refill: 0               --  --  OLDER NOTES:  ANNUAL PHYSICAL 4/21; d/w all labs; no ischemic sxs; no changes PFSH.  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    LIPIDS at goal with LDL of 74...82 with no changes yet...142 b/c taking PRN due to leg pain, so change to another... and is tolerating lipitor, so will titrate and blame the R leg pain on his back and eval when he desires...82, but will try 80 mg now...68, but , so need to drop back down and add zetia when LFT's normalize...on 40 Lipitor with qod Zetia=LDL 73 and ALT only 40, so go to qd on Zetia, but no more than 40 of lipitor...69 is best gonna get; LFT's neg 7/18 OV...81 is ok for now...120 in 1/20 b/c sick in hosp past month; no changes needed...74 is  goal... 59---> 80 in 4/21, so increase on RTO if same.  --  HTN remains well controlled=ditto 10/20.  --  HYPOTHYROIDISM...increase again 8/17...0.3 despite back on lower dose=112, so stay on it... stable on this dose 1/19...TSH 0.16, so rec try 100 in 7/19...was fine in 11/19, but 23 at 1/20 OV after a month in hosp; I d/w finish up the 112 he has on hand and then back to 100---> up 4/21, but no c/o, so defer RTO.  --  PORTAL VEIN THROMBOSIS occurred after the stenosis below, so it has been over 6 mo as of 10/20 OV and he has not had a restenosis after temp stents removed, so I agree with stopping Eliquis at this time---> no sxs this regard 4/21.  --  PSC CIRRHOSIS=dx'd 10/18 after elevated LFT's noted during eval of upper back pain with jaundice; s/p failed cannulation of CBD 12/21/18...admitted [facility] 12/19 with biliary stricture and had failed ERCP again; required percutaneous drain; concern for chloangiocarcinoma noted; was d/c on the 13 and readmitted on 12/26 and required stents; then 3rd admit for portal vein thrombosis; has f/u with Dr Pena...they need to put in permanent stents sometime, but pt wants to wait as of 4/20 due to virus...stents out 5/20 with stable labs...ditto 10/20 and they didn't call him back and that's ok since LFTs wnl---> AFP/lipase/LFT's neg 4/21.    ULCERATIVE COLITIS since '82 on below and with no recent flare...says no issues 7/19...c-scope 9/19 was neg---> no bowel issues 4/21.  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  --  VIT D DEF wnl at 37...later still.  R ULNAR ENTRAPMENT 2/18 = tender, so will try medrol; to ortho if no better... better after tx per chiropractor, so defer as of 7/18 OV.  --  PTOSIS...this is not MG, eval per optho for surgery...here for pre-op clearance...10/3...no CP/SOB, c/w meds, labs noted...s/p 10/13.  HOARSENESS d/w me at 7/18 OV; no concerning meds, no  sprays OTC, no A.R., no GERD; will ask ENT to eval for polyps.  --  --  PSA 3.0 (6/16/25)  COLON 5/25 = wnl = annually per Dr Naranjo's recs.    (, 2 kids=son here and girl in [place], hospitals houses= still working with [employer] as of 12/23 OV).    Follow Up   Return in about 6 months (around 12/18/2025).  Patient was given instructions and counseling regarding his condition or for health maintenance advice. Please see specific information pulled into the AVS if appropriate.

## 2025-06-18 NOTE — ASSESSMENT & PLAN NOTE
T states is stable at 0.9 as of his 6/25 OV.  He is on low-dose 88 mcg and stable to continue same.

## 2025-06-18 NOTE — ASSESSMENT & PLAN NOTE
AWV completed 6/25.  Patient remains active and independent.  No falls in the past year.  He was hospitalized overnight in 12/24 in regards to chest pain.  His living will is already on file at the hospital.

## 2025-06-18 NOTE — ASSESSMENT & PLAN NOTE
This was an issue on the left side in 12/22, plain films were obtained, and he was on low-dose gabapentin for a time.    It is flaring up again in 6/25, but on the right side this time, rating down into his buttock.  There was no trauma, there are no danger signs, no change in bowel/bladder etc.    We will see if this will respond to low-dose medications again, asked patient to reach out to us in a couple of weeks if not improving, we will get films then and get him in with physical therapy at least.

## 2025-06-18 NOTE — ASSESSMENT & PLAN NOTE
His LDL is stuck around to 100 as of his 6/25 OV.  He is on Nexletol and Zetia since he is intolerant to statins.    He is followed by cardiology, Dr. Refugio Mejia, so obviously will defer PCSK9 inhibitor to him.

## (undated) DEVICE — NC TREK NEO™ CORONARY DILATATION CATHETER 3.25 MM X 20 MM / RAPID-EXCHANGE: Brand: NC TREK NEO™

## (undated) DEVICE — CATH F6 ST JL 3.5 100CM: Brand: SUPERTORQUE

## (undated) DEVICE — CONN JET HYDRA H20 AUXILIARY DISP

## (undated) DEVICE — NC TREK NEO™ CORONARY DILATATION CATHETER 3.50 MM X 20 MM / RAPID-EXCHANGE: Brand: NC TREK NEO™

## (undated) DEVICE — ANGIO-SEAL EVOLUTION VASCULAR CLOSURE DEVICE: Brand: ANGIO-SEAL

## (undated) DEVICE — CATH GUIDE LAUNCHER EBU3.5 6F 100CM

## (undated) DEVICE — GUIDEWIRE 38/150/MC/PTFE/3J: Brand: GUIDEWIRE

## (undated) DEVICE — SOL IRRG H2O PL/BG 1000ML STRL

## (undated) DEVICE — RUNTHROUGH NS EXTRA FLOPPY PTCA GUIDEWIRE: Brand: RUNTHROUGH

## (undated) DEVICE — SOLIDIFIER LIQLOC PLS 1500CC BT

## (undated) DEVICE — PINNACLE INTRODUCER SHEATH: Brand: PINNACLE

## (undated) DEVICE — DEFENDO AIR WATER SUCTION AND BIOPSY VALVE KIT FOR  OLYMPUS: Brand: DEFENDO AIR/WATER/SUCTION AND BIOPSY VALVE

## (undated) DEVICE — ST ACC MICROPUNCTURE STFF .018 ECHO/PLAT/TP 4F/10CM 21G/7CM

## (undated) DEVICE — Device

## (undated) DEVICE — TREK CORONARY DILATATION CATHETER 2.50 MM X 12 MM / RAPID-EXCHANGE: Brand: TREK

## (undated) DEVICE — CATH IMG IVUS EAGLE EYE PLATIN RX DIGITAL .014IN 5FR

## (undated) DEVICE — LINER SURG CANSTR SXN S/RIGD 1500CC

## (undated) DEVICE — Device: Brand: DEFENDO AIR/WATER/SUCTION AND BIOPSY VALVE

## (undated) DEVICE — CATH F6 ST JR 4 100CM: Brand: SUPERTORQUE

## (undated) DEVICE — SINGLE-USE BIOPSY FORCEPS: Brand: RADIAL JAW 4

## (undated) DEVICE — CATH F6 ST IM 100CM: Brand: SUPERTORQUE